# Patient Record
Sex: MALE | Race: OTHER | HISPANIC OR LATINO | Employment: FULL TIME | URBAN - METROPOLITAN AREA
[De-identification: names, ages, dates, MRNs, and addresses within clinical notes are randomized per-mention and may not be internally consistent; named-entity substitution may affect disease eponyms.]

---

## 2019-10-09 ENCOUNTER — HOSPITAL ENCOUNTER (EMERGENCY)
Facility: HOSPITAL | Age: 24
Discharge: HOME | End: 2019-10-09
Attending: EMERGENCY MEDICINE

## 2019-10-09 VITALS
RESPIRATION RATE: 16 BRPM | DIASTOLIC BLOOD PRESSURE: 98 MMHG | OXYGEN SATURATION: 100 % | WEIGHT: 210 LBS | HEIGHT: 66 IN | SYSTOLIC BLOOD PRESSURE: 159 MMHG | TEMPERATURE: 99 F | HEART RATE: 65 BPM | BODY MASS INDEX: 33.75 KG/M2

## 2019-10-09 DIAGNOSIS — L72.0 EPIDERMAL CYST OF EAR: Primary | ICD-10-CM

## 2019-10-09 DIAGNOSIS — L29.3 ITCHING OF PENIS: ICD-10-CM

## 2019-10-09 PROCEDURE — 99283 EMERGENCY DEPT VISIT LOW MDM: CPT | Mod: 25

## 2019-10-09 PROCEDURE — 10060 I&D ABSCESS SIMPLE/SINGLE: CPT | Mod: RT

## 2019-10-09 RX ORDER — NYSTATIN 100000 U/G
CREAM TOPICAL 2 TIMES DAILY
Qty: 15 G | Refills: 0 | Status: SHIPPED | OUTPATIENT
Start: 2019-10-09 | End: 2019-10-16

## 2019-10-09 RX ORDER — MUPIROCIN 20 MG/G
OINTMENT TOPICAL 3 TIMES DAILY
Qty: 30 G | Refills: 0 | Status: SHIPPED | OUTPATIENT
Start: 2019-10-09 | End: 2023-10-22

## 2019-10-09 RX ORDER — SULFAMETHOXAZOLE AND TRIMETHOPRIM 800; 160 MG/1; MG/1
1 TABLET ORAL 2 TIMES DAILY
Qty: 20 TABLET | Refills: 0 | Status: SHIPPED | OUTPATIENT
Start: 2019-10-09 | End: 2019-10-19

## 2019-10-10 NOTE — ED PROVIDER NOTES
Encounter Date: 10/9/2019       History     Chief Complaint   Patient presents with    Rash     RASH ON PENIS, BOIL BEHIND RIGHT EAR     24-year-old male, presents to the emergency department for evaluation of complaints of tenderness and swelling in the postauricular area of the right ear.    Patient also complains of itching to his penis.        Review of patient's allergies indicates:  No Known Allergies  No past medical history on file.  No past surgical history on file.  No family history on file.  Social History     Tobacco Use    Smoking status: Not on file   Substance Use Topics    Alcohol use: Not on file    Drug use: Not on file     Review of Systems   Constitutional: Negative for fever.   HENT: Negative for sore throat.    Eyes: Negative for redness.   Respiratory: Negative for shortness of breath.    Cardiovascular: Negative for chest pain.   Gastrointestinal: Negative for nausea.   Genitourinary: Negative for dysuria.        Penile pruritus   Musculoskeletal: Negative for back pain.   Skin: Positive for wound. Negative for rash.   Neurological: Negative for weakness.   Hematological: Does not bruise/bleed easily.   Psychiatric/Behavioral: Negative for behavioral problems. The patient is not nervous/anxious.    All other systems reviewed and are negative.      Physical Exam     Initial Vitals [10/09/19 2054]   BP Pulse Resp Temp SpO2   (!) 169/108 65 16 98.5 °F (36.9 °C) 100 %      MAP       --         Physical Exam    Nursing note and vitals reviewed.  Constitutional: He appears well-developed and well-nourished.   HENT:   Head: Normocephalic and atraumatic.       Right Ear: External ear normal.   A small 0.5 cm by 0.5 cm a palpable lesion, fluctuant consistent with a small cyst.  No obvious cellulitis.   Eyes: Conjunctivae, EOM and lids are normal.   Neck: Normal range of motion and full passive range of motion without pain.   Genitourinary: Testes normal. Uncircumcised. No phimosis, paraphimosis,  hypospadias, penile erythema or penile tenderness. No discharge found.   Genitourinary Comments: Patient with very mild erythema noted at the meatus.  No glaring balanitis.   Musculoskeletal: Normal range of motion.   Neurological: He is alert.   Skin: Skin is warm, dry and intact.   Psychiatric: He has a normal mood and affect. His speech is normal and behavior is normal.         ED Course   I & D - Incision and Drainage  Date/Time: 10/9/2019 9:10 PM  Performed by: CHUCK Martinez  Authorized by: Nir Stephens MD   Type: cyst  Body area: head/neck  Location details: scalp  Patient sedated: no  Incision type: single straight  Complexity: simple  Drainage: pus  Drainage amount: scant  Wound treatment: incision,  wound left open and  drainage  Technical procedures used: Bevel of an 18 gauge needle was used to rupture of the cyst and make a small 3 mm incision for drainage.  Complications: No  Specimens: No  Implants: No  Patient tolerance: Patient tolerated the procedure well with no immediate complications  Comments: Band-Aid placed        Labs Reviewed - No data to display       Imaging Results    None          Medical Decision Making:   Initial Assessment:   NAD  Differential Diagnosis:   The patient's differential diagnoses includes but is not limited to cyst, balanitis  ED Management:  24-year-old male, presents to the emergency department for evaluation of a lesion to the right ear, a small cyst which became secondarily infected.  Incised and drained.  Will place on a short course of antibiotics, local wound care instructions discussed.  Patient also states he is having some itching to his penis.  Patient is uncircumcised male, there are no ulcerative lesions, there is no penile drainage, patient does not have dysuria or other symptoms suggestive of STI.  Retraction of the foreskin and reduction of the foreskin is easily accomplished.  The meatus itself may have some very mild erythema, however there  is no evidence of a glaring balanitis.  Foreskin hygiene discussed.  Other:   I have discussed this case with another health care provider.       <> Summary of the Discussion: The patient's emergency department presentation, clinical course, pertinent findings of the physical exam as well as workup were discussed with the attending physician.  Plan of care was reviewed.                      Clinical Impression:       ICD-10-CM ICD-9-CM   1. Epidermal cyst of ear L72.0 706.2   2. Itching of penis L29.3 698.1                                CHUCK Martinez  10/09/19 2118

## 2021-01-26 ENCOUNTER — HOSPITAL ENCOUNTER (EMERGENCY)
Facility: HOSPITAL | Age: 26
Discharge: HOME | End: 2021-01-26
Attending: EMERGENCY MEDICINE

## 2021-01-26 VITALS
SYSTOLIC BLOOD PRESSURE: 126 MMHG | TEMPERATURE: 98 F | BODY MASS INDEX: 31.47 KG/M2 | OXYGEN SATURATION: 100 % | DIASTOLIC BLOOD PRESSURE: 88 MMHG | HEART RATE: 62 BPM | RESPIRATION RATE: 18 BRPM | WEIGHT: 195 LBS

## 2021-01-26 DIAGNOSIS — K02.9 DENTAL CARIES: ICD-10-CM

## 2021-01-26 DIAGNOSIS — R51.9 FACIAL PAIN: ICD-10-CM

## 2021-01-26 DIAGNOSIS — K08.89 PAIN, DENTAL: Primary | ICD-10-CM

## 2021-01-26 PROCEDURE — 99284 EMERGENCY DEPT VISIT MOD MDM: CPT

## 2021-01-26 RX ORDER — CARBAMAZEPINE 100 MG/1
100 TABLET, EXTENDED RELEASE ORAL 2 TIMES DAILY
Qty: 20 TABLET | Refills: 0 | Status: SHIPPED | OUTPATIENT
Start: 2021-01-26 | End: 2021-02-05

## 2021-01-26 RX ORDER — NAPROXEN 500 MG/1
500 TABLET ORAL 2 TIMES DAILY WITH MEALS
Qty: 14 TABLET | Refills: 0 | Status: SHIPPED | OUTPATIENT
Start: 2021-01-26 | End: 2021-02-02

## 2021-09-10 ENCOUNTER — HOSPITAL ENCOUNTER (EMERGENCY)
Facility: HOSPITAL | Age: 26
Discharge: HOME | End: 2021-09-10
Attending: EMERGENCY MEDICINE
Payer: COMMERCIAL

## 2021-09-10 VITALS
OXYGEN SATURATION: 95 % | BODY MASS INDEX: 31.83 KG/M2 | DIASTOLIC BLOOD PRESSURE: 100 MMHG | SYSTOLIC BLOOD PRESSURE: 158 MMHG | HEART RATE: 82 BPM | RESPIRATION RATE: 16 BRPM | TEMPERATURE: 99 F | HEIGHT: 68 IN | WEIGHT: 210 LBS

## 2021-09-10 DIAGNOSIS — S39.012A STRAIN OF LUMBAR REGION, INITIAL ENCOUNTER: ICD-10-CM
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
## 2021-09-10 PROCEDURE — 99283 EMERGENCY DEPT VISIT LOW MDM: CPT

## 2023-10-22 ENCOUNTER — HOSPITAL ENCOUNTER (EMERGENCY)
Facility: HOSPITAL | Age: 28
Discharge: HOME | End: 2023-10-22
Attending: STUDENT IN AN ORGANIZED HEALTH CARE EDUCATION/TRAINING PROGRAM

## 2023-10-22 VITALS
DIASTOLIC BLOOD PRESSURE: 82 MMHG | RESPIRATION RATE: 20 BRPM | SYSTOLIC BLOOD PRESSURE: 150 MMHG | WEIGHT: 210 LBS | OXYGEN SATURATION: 99 % | HEART RATE: 98 BPM | BODY MASS INDEX: 33.75 KG/M2 | HEIGHT: 66 IN | TEMPERATURE: 99 F

## 2023-10-22 DIAGNOSIS — R07.9 CHEST PAIN: ICD-10-CM

## 2023-10-22 DIAGNOSIS — R07.9 CHEST PAIN, UNSPECIFIED TYPE: ICD-10-CM

## 2023-10-22 DIAGNOSIS — F41.0 PANIC ATTACK: ICD-10-CM

## 2023-10-22 DIAGNOSIS — R06.02 SOB (SHORTNESS OF BREATH): Primary | ICD-10-CM

## 2023-10-22 DIAGNOSIS — I10 HYPERTENSION, UNSPECIFIED TYPE: ICD-10-CM

## 2023-10-22 LAB
ALBUMIN SERPL BCP-MCNC: 4.9 G/DL (ref 3.5–5.2)
ALP SERPL-CCNC: 81 U/L (ref 55–135)
ALT SERPL W/O P-5'-P-CCNC: 90 U/L (ref 10–44)
ANION GAP SERPL CALC-SCNC: 12 MMOL/L (ref 8–16)
AST SERPL-CCNC: 29 U/L (ref 10–40)
BASOPHILS # BLD AUTO: 0.07 K/UL (ref 0–0.2)
BASOPHILS NFR BLD: 0.5 % (ref 0–1.9)
BILIRUB SERPL-MCNC: 0.5 MG/DL (ref 0.1–1)
BNP SERPL-MCNC: 13 PG/ML (ref 0–99)
BUN SERPL-MCNC: 8 MG/DL (ref 6–20)
CALCIUM SERPL-MCNC: 9.7 MG/DL (ref 8.7–10.5)
CHLORIDE SERPL-SCNC: 103 MMOL/L (ref 95–110)
CO2 SERPL-SCNC: 20 MMOL/L (ref 23–29)
CREAT SERPL-MCNC: 1.1 MG/DL (ref 0.5–1.4)
D DIMER PPP IA.FEU-MCNC: 0.22 MG/L FEU
DIFFERENTIAL METHOD: ABNORMAL
EOSINOPHIL # BLD AUTO: 0.1 K/UL (ref 0–0.5)
EOSINOPHIL NFR BLD: 0.5 % (ref 0–8)
ERYTHROCYTE [DISTWIDTH] IN BLOOD BY AUTOMATED COUNT: 12.6 % (ref 11.5–14.5)
EST. GFR  (NO RACE VARIABLE): >60 ML/MIN/1.73 M^2
GLUCOSE SERPL-MCNC: 123 MG/DL (ref 70–110)
HCT VFR BLD AUTO: 46.4 % (ref 40–54)
HGB BLD-MCNC: 16.8 G/DL (ref 14–18)
IMM GRANULOCYTES # BLD AUTO: 0.05 K/UL (ref 0–0.04)
IMM GRANULOCYTES NFR BLD AUTO: 0.3 % (ref 0–0.5)
INR PPP: 1 (ref 0.8–1.2)
LYMPHOCYTES # BLD AUTO: 1.9 K/UL (ref 1–4.8)
LYMPHOCYTES NFR BLD: 12.8 % (ref 18–48)
MCH RBC QN AUTO: 32.2 PG (ref 27–31)
MCHC RBC AUTO-ENTMCNC: 36.2 G/DL (ref 32–36)
MCV RBC AUTO: 89 FL (ref 82–98)
MONOCYTES # BLD AUTO: 0.9 K/UL (ref 0.3–1)
MONOCYTES NFR BLD: 5.9 % (ref 4–15)
NEUTROPHILS # BLD AUTO: 11.7 K/UL (ref 1.8–7.7)
NEUTROPHILS NFR BLD: 80 % (ref 38–73)
NRBC BLD-RTO: 0 /100 WBC
PLATELET # BLD AUTO: 278 K/UL (ref 150–450)
PMV BLD AUTO: 11.9 FL (ref 9.2–12.9)
POTASSIUM SERPL-SCNC: 3.6 MMOL/L (ref 3.5–5.1)
PROT SERPL-MCNC: 7.9 G/DL (ref 6–8.4)
PROTHROMBIN TIME: 10.9 SEC (ref 9–12.5)
RBC # BLD AUTO: 5.21 M/UL (ref 4.6–6.2)
SODIUM SERPL-SCNC: 135 MMOL/L (ref 136–145)
TROPONIN I SERPL HS-MCNC: 13.6 PG/ML (ref 0–14.9)
TROPONIN I SERPL HS-MCNC: 14.2 PG/ML (ref 0–14.9)
TROPONIN I SERPL HS-MCNC: 15.4 PG/ML (ref 0–14.9)
WBC # BLD AUTO: 14.65 K/UL (ref 3.9–12.7)

## 2023-10-22 PROCEDURE — 63600175 PHARM REV CODE 636 W HCPCS: Performed by: STUDENT IN AN ORGANIZED HEALTH CARE EDUCATION/TRAINING PROGRAM

## 2023-10-22 PROCEDURE — 99285 EMERGENCY DEPT VISIT HI MDM: CPT | Mod: 25

## 2023-10-22 PROCEDURE — 85379 FIBRIN DEGRADATION QUANT: CPT | Performed by: STUDENT IN AN ORGANIZED HEALTH CARE EDUCATION/TRAINING PROGRAM

## 2023-10-22 PROCEDURE — 80307 DRUG TEST PRSMV CHEM ANLYZR: CPT | Performed by: STUDENT IN AN ORGANIZED HEALTH CARE EDUCATION/TRAINING PROGRAM

## 2023-10-22 PROCEDURE — 85610 PROTHROMBIN TIME: CPT | Performed by: STUDENT IN AN ORGANIZED HEALTH CARE EDUCATION/TRAINING PROGRAM

## 2023-10-22 PROCEDURE — 96361 HYDRATE IV INFUSION ADD-ON: CPT

## 2023-10-22 PROCEDURE — 96374 THER/PROPH/DIAG INJ IV PUSH: CPT

## 2023-10-22 PROCEDURE — 80324 DRUG SCREEN AMPHETAMINES 1/2: CPT | Performed by: STUDENT IN AN ORGANIZED HEALTH CARE EDUCATION/TRAINING PROGRAM

## 2023-10-22 PROCEDURE — 83880 ASSAY OF NATRIURETIC PEPTIDE: CPT | Performed by: STUDENT IN AN ORGANIZED HEALTH CARE EDUCATION/TRAINING PROGRAM

## 2023-10-22 PROCEDURE — 80359 METHYLENEDIOXYAMPHETAMINES: CPT | Performed by: STUDENT IN AN ORGANIZED HEALTH CARE EDUCATION/TRAINING PROGRAM

## 2023-10-22 PROCEDURE — 93005 ELECTROCARDIOGRAM TRACING: CPT | Performed by: INTERNAL MEDICINE

## 2023-10-22 PROCEDURE — 84484 ASSAY OF TROPONIN QUANT: CPT | Performed by: STUDENT IN AN ORGANIZED HEALTH CARE EDUCATION/TRAINING PROGRAM

## 2023-10-22 PROCEDURE — 85025 COMPLETE CBC W/AUTO DIFF WBC: CPT | Performed by: STUDENT IN AN ORGANIZED HEALTH CARE EDUCATION/TRAINING PROGRAM

## 2023-10-22 PROCEDURE — 80053 COMPREHEN METABOLIC PANEL: CPT | Performed by: STUDENT IN AN ORGANIZED HEALTH CARE EDUCATION/TRAINING PROGRAM

## 2023-10-22 PROCEDURE — 93010 ELECTROCARDIOGRAM REPORT: CPT | Mod: ,,, | Performed by: INTERNAL MEDICINE

## 2023-10-22 PROCEDURE — 93010 EKG 12-LEAD: ICD-10-PCS | Mod: ,,, | Performed by: INTERNAL MEDICINE

## 2023-10-22 PROCEDURE — 25000003 PHARM REV CODE 250: Performed by: STUDENT IN AN ORGANIZED HEALTH CARE EDUCATION/TRAINING PROGRAM

## 2023-10-22 RX ORDER — LORAZEPAM 2 MG/ML
1 INJECTION INTRAMUSCULAR
Status: COMPLETED | OUTPATIENT
Start: 2023-10-22 | End: 2023-10-22

## 2023-10-22 RX ADMIN — SODIUM CHLORIDE 1000 ML: 0.9 INJECTION, SOLUTION INTRAVENOUS at 12:10

## 2023-10-22 RX ADMIN — LORAZEPAM 1 MG: 2 INJECTION INTRAMUSCULAR; INTRAVENOUS at 01:10

## 2023-10-22 NOTE — ED PROVIDER NOTES
Encounter Date: 10/22/2023       History     Chief Complaint   Patient presents with    Shortness of Breath     Patient brought by EMS initial call was for short of breath    Chest Pain     Left side chest pain     HPI    28-year-old male with no significant past medical history presenting with 1 hour of chest pain, shortness of breath, anxiety.  Symptoms started suddenly while he was driving.  EMS found him with a blood pressure 220/100, slightly diaphoretic and anxious appearing.  Patient denies any previous symptoms like this before.  No new medications.  No rash.    Review of patient's allergies indicates:  No Known Allergies  No past medical history on file.  No past surgical history on file.  No family history on file.  Social History     Tobacco Use    Smoking status: Every Day     Current packs/day: 0.50     Types: Cigarettes    Smokeless tobacco: Never   Substance Use Topics    Alcohol use: Yes     Comment: soc     Review of Systems   Constitutional:  Negative for activity change, appetite change, chills and fever.   HENT:  Negative for congestion and rhinorrhea.    Respiratory:  Positive for chest tightness and shortness of breath.    Cardiovascular:  Positive for chest pain.   Gastrointestinal:  Negative for abdominal pain, nausea and vomiting.   Skin:  Negative for rash and wound.   Neurological:  Negative for light-headedness and headaches.   Psychiatric/Behavioral:  The patient is nervous/anxious.        Physical Exam     Initial Vitals [10/22/23 1229]   BP Pulse Resp Temp SpO2   137/89 (!) 138 18 99.8 °F (37.7 °C) 99 %      MAP       --         Physical Exam    Constitutional: He appears well-developed. He appears distressed.   HENT:   Head: Normocephalic and atraumatic.   Eyes: Conjunctivae are normal.   Neck:   Normal range of motion.  Cardiovascular:      Exam reveals no gallop and no friction rub.       No murmur heard.  Tachycardia with rate of 140, regular appearing   Pulmonary/Chest: Breath  sounds normal. No respiratory distress. He has no wheezes. He has no rhonchi. He has no rales.   Abdominal: Abdomen is soft. He exhibits no distension. There is no abdominal tenderness.   Musculoskeletal:         General: No edema. Normal range of motion.      Cervical back: Normal range of motion.     Neurological: He is alert and oriented to person, place, and time.   Skin: Skin is warm. Capillary refill takes less than 2 seconds.         ED Course   Procedures  Labs Reviewed   CBC W/ AUTO DIFFERENTIAL - Abnormal; Notable for the following components:       Result Value    WBC 14.65 (*)     MCH 32.2 (*)     MCHC 36.2 (*)     Gran # (ANC) 11.7 (*)     Immature Grans (Abs) 0.05 (*)     Gran % 80.0 (*)     Lymph % 12.8 (*)     All other components within normal limits   COMPREHENSIVE METABOLIC PANEL - Abnormal; Notable for the following components:    Sodium 135 (*)     CO2 20 (*)     Glucose 123 (*)     ALT 90 (*)     All other components within normal limits   TROPONIN I HIGH SENSITIVITY - Abnormal; Notable for the following components:    Troponin I High Sensitivity 15.4 (*)     All other components within normal limits   B-TYPE NATRIURETIC PEPTIDE   TROPONIN I HIGH SENSITIVITY   TROPONIN I HIGH SENSITIVITY   PROTIME-INR   D DIMER, QUANTITATIVE   DRUGS OF ABUSE SCREEN, BLOOD        ECG Results              Repeat EKG 12-lead (In process)  Result time 10/22/23 14:10:24      In process by Interface, Lab In [facility] (10/22/23 14:10:24)                   Narrative:    Test Reason : R07.9,    Vent. Rate : 106 BPM     Atrial Rate : 106 BPM     P-R Int : 136 ms          QRS Dur : 092 ms      QT Int : 328 ms       P-R-T Axes : 050 022 033 degrees     QTc Int : 435 ms    Sinus tachycardia  Otherwise normal ECG  When compared with ECG of 22-OCT-2023 12:34,  No significant change was found    Referred By: [email or web address]   SELF           Confirmed By:                                      EKG 12-lead (In process)  Result time  10/22/23 13:03:42      In process by Interface, Lab In [facility] (10/22/23 13:03:42)                   Narrative:    Test Reason : R07.9,    Vent. Rate : 136 BPM     Atrial Rate : 136 BPM     P-R Int : 130 ms          QRS Dur : 086 ms      QT Int : 300 ms       P-R-T Axes : 040 032 027 degrees     QTc Int : 451 ms    Sinus tachycardia  Junctional ST depression, probably normal  Borderline Abnormal ECG  No previous ECGs available    Referred By:             Confirmed By:                                   Imaging Results              X-Ray Chest AP Portable (Final result)  Result time 10/22/23 12:58:26      Final result by Cheryl Lawson MD (10/22/23 12:58:26)                   Narrative:    XR CHEST 1 VIEW    CLINICAL HISTORY:  28 years Male chest pain    COMPARISON: None    FINDINGS: Cardiomediastinal silhouette is within normal limits. Lungs are normally expanded with no airspace consolidation. No pleural effusion or pneumothorax. No acute osseous abnormality.    IMPRESSION: No acute pulmonary process.    Electronically signed by:  Cheryl Lawson MD  10/22/2023 12:58 PM CDT Workstation: [id number]                                     Medications   sodium chloride 0.9% bolus 1,000 mL 1,000 mL (0 mLs Intravenous Stopped 10/22/23 1356)   LORazepam injection 1 mg (1 mg Intravenous Given 10/22/23 1308)     Medical Decision Making  28-year-old male presenting with acute onset shortness of breath, left-sided chest pain, and anxiety.  No previous symptoms similar.  Initial blood pressure was 200/100, heart rate 140, oxygen saturation 100% with an respiratory rate of 20.  Lungs were clear.  No murmur.  Bedside ultrasound without any pneumothorax or pericardial effusion.  EKG initially sinus tach with a rate around 140 with suspected rate dependent ST depression without any ST elevation.  Repeat troponin is no Troponin initially 14 with repeat a 15.  D-dimer negative.  Chest x-ray without any pneumothorax and no airspace  disease.  Patient received 1 mg of Ativan with resolution of symptoms.  He is currently feeling at baseline without shortness of breath.  Heart rate is in the 90s and blood pressure has improved to 130/77.  Low suspicion for ACS given age, lack of risk factors, and normal EKG.  No significant change on repeat troponin.  PE is unlikely given normal D-dimer.  We will discharge patient with [facility] referral and strict return precautions.    Candelario Crook MD  Emergency Medicine      Amount and/or Complexity of Data Reviewed  Labs: ordered.  Radiology: ordered.    Risk  Prescription drug management.                               Clinical Impression:   Final diagnoses:  [R07.9] Chest pain  [R07.9] Chest pain, unspecified type  [I10] Hypertension, unspecified type  [R06.02] SOB (shortness of breath) (Primary)  [F41.0] Panic attack        ED Disposition Condition    Discharge Stable          ED Prescriptions    None       Follow-up Information       Follow up With Specialties Details Why Contact Info Additional Information    [facility] - Emergency Dept Emergency Medicine  As needed, If symptoms worsen including recurrent chest pain, shortness of breath, anxiety, any other concerns [address]  [facility] [id number]  [phone] 1st floor    [facility]  Schedule an appointment as soon as possible for a visit   [address]  [facility] [address]  [phone]                Candelario Crook MD  10/22/23 4852

## 2023-10-23 ENCOUNTER — HOSPITAL ENCOUNTER (EMERGENCY)
Facility: HOSPITAL | Age: 28
Discharge: HOME | End: 2023-10-23
Attending: EMERGENCY MEDICINE

## 2023-10-23 VITALS
WEIGHT: 210 LBS | DIASTOLIC BLOOD PRESSURE: 97 MMHG | BODY MASS INDEX: 33.75 KG/M2 | HEART RATE: 68 BPM | OXYGEN SATURATION: 97 % | SYSTOLIC BLOOD PRESSURE: 154 MMHG | TEMPERATURE: 98 F | RESPIRATION RATE: 24 BRPM | HEIGHT: 66 IN

## 2023-10-23 DIAGNOSIS — R07.9 CHEST PAIN: ICD-10-CM

## 2023-10-23 DIAGNOSIS — R03.0 BLOOD PRESSURE ELEVATED WITHOUT HISTORY OF HTN: Primary | ICD-10-CM

## 2023-10-23 DIAGNOSIS — R06.02 SOB (SHORTNESS OF BREATH): ICD-10-CM

## 2023-10-23 LAB
ALBUMIN SERPL BCP-MCNC: 4.5 G/DL (ref 3.5–5.2)
ALP SERPL-CCNC: 89 U/L (ref 55–135)
ALT SERPL W/O P-5'-P-CCNC: 100 U/L (ref 10–44)
ANION GAP SERPL CALC-SCNC: 12 MMOL/L (ref 8–16)
AST SERPL-CCNC: 38 U/L (ref 10–40)
BASOPHILS # BLD AUTO: 0.05 K/UL (ref 0–0.2)
BASOPHILS NFR BLD: 0.7 % (ref 0–1.9)
BILIRUB SERPL-MCNC: 0.9 MG/DL (ref 0.1–1)
BUN SERPL-MCNC: 10 MG/DL (ref 6–20)
CALCIUM SERPL-MCNC: 9.7 MG/DL (ref 8.7–10.5)
CHLORIDE SERPL-SCNC: 108 MMOL/L (ref 95–110)
CO2 SERPL-SCNC: 19 MMOL/L (ref 23–29)
CREAT SERPL-MCNC: 0.9 MG/DL (ref 0.5–1.4)
DIFFERENTIAL METHOD: ABNORMAL
EOSINOPHIL # BLD AUTO: 0.1 K/UL (ref 0–0.5)
EOSINOPHIL NFR BLD: 0.8 % (ref 0–8)
ERYTHROCYTE [DISTWIDTH] IN BLOOD BY AUTOMATED COUNT: 12.4 % (ref 11.5–14.5)
EST. GFR  (NO RACE VARIABLE): >60 ML/MIN/1.73 M^2
GLUCOSE SERPL-MCNC: 104 MG/DL (ref 70–110)
HCT VFR BLD AUTO: 49.5 % (ref 40–54)
HGB BLD-MCNC: 17.6 G/DL (ref 14–18)
IMM GRANULOCYTES # BLD AUTO: 0.03 K/UL (ref 0–0.04)
IMM GRANULOCYTES NFR BLD AUTO: 0.4 % (ref 0–0.5)
LYMPHOCYTES # BLD AUTO: 1.7 K/UL (ref 1–4.8)
LYMPHOCYTES NFR BLD: 22.6 % (ref 18–48)
MCH RBC QN AUTO: 32.1 PG (ref 27–31)
MCHC RBC AUTO-ENTMCNC: 35.6 G/DL (ref 32–36)
MCV RBC AUTO: 90 FL (ref 82–98)
MONOCYTES # BLD AUTO: 0.5 K/UL (ref 0.3–1)
MONOCYTES NFR BLD: 6.9 % (ref 4–15)
NEUTROPHILS # BLD AUTO: 5.1 K/UL (ref 1.8–7.7)
NEUTROPHILS NFR BLD: 68.6 % (ref 38–73)
NRBC BLD-RTO: 0 /100 WBC
PLATELET # BLD AUTO: 264 K/UL (ref 150–450)
PMV BLD AUTO: 12 FL (ref 9.2–12.9)
POTASSIUM SERPL-SCNC: 3.8 MMOL/L (ref 3.5–5.1)
PROT SERPL-MCNC: 7.9 G/DL (ref 6–8.4)
RBC # BLD AUTO: 5.48 M/UL (ref 4.6–6.2)
SODIUM SERPL-SCNC: 139 MMOL/L (ref 136–145)
TROPONIN I SERPL DL<=0.01 NG/ML-MCNC: 0.02 NG/ML (ref 0–0.03)
WBC # BLD AUTO: 7.44 K/UL (ref 3.9–12.7)

## 2023-10-23 PROCEDURE — 93010 EKG 12-LEAD: ICD-10-PCS | Mod: ,,, | Performed by: GENERAL PRACTICE

## 2023-10-23 PROCEDURE — 80053 COMPREHEN METABOLIC PANEL: CPT | Performed by: PHYSICIAN ASSISTANT

## 2023-10-23 PROCEDURE — 85025 COMPLETE CBC W/AUTO DIFF WBC: CPT | Performed by: PHYSICIAN ASSISTANT

## 2023-10-23 PROCEDURE — 99285 EMERGENCY DEPT VISIT HI MDM: CPT | Mod: 25

## 2023-10-23 PROCEDURE — 36415 COLL VENOUS BLD VENIPUNCTURE: CPT | Performed by: PHYSICIAN ASSISTANT

## 2023-10-23 PROCEDURE — 93010 ELECTROCARDIOGRAM REPORT: CPT | Mod: ,,, | Performed by: GENERAL PRACTICE

## 2023-10-23 PROCEDURE — 93005 ELECTROCARDIOGRAM TRACING: CPT

## 2023-10-23 PROCEDURE — 84484 ASSAY OF TROPONIN QUANT: CPT | Performed by: PHYSICIAN ASSISTANT

## 2023-10-24 NOTE — ED PROVIDER NOTES
Encounter Date: 10/23/2023       History     Chief Complaint   Patient presents with    Shortness of Breath     Started yesterday     Hypertension    Chest Pain     Armando Lowe is a 28 y.o. male presenting for evaluation of recurrent chest pain and shortness of breath despite being evaluated in the ED at [facility] yesterday. No fever, no chills.  He is worried about his blood pressure being elevated.  No nausea, vomiting, diarrhea.  No cough.   He has no past medical history on file.      The history is provided by the patient.     Review of patient's allergies indicates:  No Known Allergies  No past medical history on file.  No past surgical history on file.  No family history on file.  Social History     Tobacco Use    Smoking status: Every Day     Current packs/day: 0.50     Types: Cigarettes    Smokeless tobacco: Never   Substance Use Topics    Alcohol use: Yes     Comment: soc     Review of Systems   Constitutional:  Negative for chills and fever.        Elevated blood pressure.    Respiratory:  Positive for shortness of breath. Negative for cough, chest tightness and wheezing.    Cardiovascular:  Positive for chest pain. Negative for palpitations.   Gastrointestinal:  Negative for abdominal pain, diarrhea, nausea and vomiting.   Genitourinary:  Negative for difficulty urinating.   Musculoskeletal:  Negative for arthralgias, back pain, joint swelling, myalgias, neck pain and neck stiffness.   Skin:  Negative for color change, pallor, rash and wound.   Neurological:  Negative for weakness and numbness.   Hematological:  Does not bruise/bleed easily.       Physical Exam     Initial Vitals [10/23/23 0917]   BP Pulse Resp Temp SpO2   (!) 168/107 76 (!) 24 98.1 °F (36.7 °C) 100 %      MAP       --         Physical Exam    Nursing note and vitals reviewed.  Constitutional: He appears well-developed and well-nourished. He is not diaphoretic. No distress.   HENT:   Head: Normocephalic and atraumatic.   Right  Ear: External ear normal.   Left Ear: External ear normal.   Nose: Nose normal.   Mouth/Throat: Oropharynx is clear and moist.   Eyes: Conjunctivae and EOM are normal. Pupils are equal, round, and reactive to light.   Neck: Neck supple.   Normal range of motion.  Cardiovascular:  Normal rate, regular rhythm, normal heart sounds and intact distal pulses.     Exam reveals no gallop and no friction rub.       No murmur heard.  Pulmonary/Chest: Breath sounds normal. No respiratory distress. He has no wheezes. He has no rhonchi. He has no rales.   Equal, bilateral breath sounds noted without wheezing.     Abdominal: Abdomen is soft. He exhibits no distension and no mass. There is no abdominal tenderness.   No palpable abdominal tenderness noted.      Musculoskeletal:         General: No tenderness or edema. Normal range of motion.      Cervical back: Normal range of motion and neck supple.     Neurological: He is alert and oriented to person, place, and time. He has normal strength. No cranial nerve deficit or sensory deficit.   Skin: Skin is warm and dry. No rash and no abscess noted. No erythema.   Psychiatric:   Pt is anxious on exam.          ED Course   Procedures  Labs Reviewed   CBC W/ AUTO DIFFERENTIAL - Abnormal; Notable for the following components:       Result Value    MCH 32.1 (*)     All other components within normal limits   COMPREHENSIVE METABOLIC PANEL - Abnormal; Notable for the following components:    CO2 19 (*)      (*)     All other components within normal limits   TROPONIN I          Imaging Results              X-Ray Chest PA And Lateral (Final result)  Result time 10/23/23 10:57:16      Final result by Gorge Lucas MD (10/23/23 10:57:16)                   Impression:      No acute process.  No significant change.      Electronically signed by: Gorge Lucas MD  Date:    10/23/2023  Time:    10:57               Narrative:    EXAMINATION:  XR CHEST PA AND LATERAL    CLINICAL  HISTORY:  Shortness of breath    TECHNIQUE:  PA and lateral views of the chest were performed.    COMPARISON:  10/22/2023    FINDINGS:  The cardiomediastinal silhouette is within normal limits.  Lung volumes are moderately low.  There is no consolidation or pleural effusion.                                       Medications - No data to display  Medical Decision Making  Differential Diagnosis:   ACS   Pneumonia   Pneumothorax  CHF   HTN   Anxiety     Pt emergently evaluated here in the ED.    Pt is well appearing on exam, but does seem to have some underlying anxiety.  EKG shows no evidence for acute ischemia or arrhythmia.  Labs stable here without leukocytosis.  Normal troponin.  Normal electrolytes.  On re-evaluation, he states he is feeling better and is ready for discharge home.  He will be referred to PCP for re-evaluation and further management.  CXR shows no evidence for acute intrapulmonary process.  He had a negative cardiac work[facility] yesterday as well.  Patient voices understanding and is agreeable to the plan.  Specific return precautions are given.        Amount and/or Complexity of Data Reviewed  Labs: ordered.  Radiology: ordered.               ED Course as of 10/23/23 2123   Mon Oct 23, 2023   0932 Sinus rhythm 77 beats per minute normal axis no ST elevation or depression or T-wave inversion independently interpreted [EF]   1008 BP(!): 168/107 [EF]   1008 Temp: 98.1 °F (36.7 °C) [EF]   1008 Temp Source: Oral [EF]   1008 Pulse: 76 [EF]   1008 Resp(!): 24 [EF]   1008 SpO2: 100 % [EF]      ED Course User Index  [EF] Matt Araujo MD                    Clinical Impression:   Final diagnoses:  [R07.9] Chest pain  [R06.02] SOB (shortness of breath)  [R03.0] Blood pressure elevated without history of HTN (Primary)        ED Disposition Condition    Discharge Stable          ED Prescriptions    None       Follow-up Information       Follow up With Specialties Details Why Contact Info Additional  Information    [place] [facility] - ED Emergency Medicine  As needed, If symptoms worsen [address] [place] [address] [address] [place] [id number] [address] [place] [address]   for primary care evaluation--make appointment to establish care [address] [place] [address] [place]   [place] [address]  [phone]                Swetha Dash PA-C  10/23/23 2121

## 2023-11-03 LAB
AMPHET SERPLBLD CFM-MCNC: 22 NG/ML
AMPHETAMINES SERPL QL SCN: ABNORMAL NG/ML
AMPHETAMINES SPEC QL: POSITIVE
BARBITURATES SERPL QL SCN: NEGATIVE UG/ML
BENZODIAZ SERPL QL SCN: NEGATIVE NG/ML
BENZODIAZ SERPL QL SCN: NEGATIVE NG/ML
CANNABINOIDS SERPL QL SCN: NEGATIVE NG/ML
MDA SERPLBLD-MCNC: NEGATIVE NG/ML
MDEA SERPLBLD CFM-MCNC: NEGATIVE NG/ML
MDMA SERPLBLD-MCNC: NEGATIVE NG/ML
METHADONE SERPL QL SCN: NEGATIVE NG/ML
METHAMPHET SERPLBLD CFM-MCNC: 137 NG/ML
OPIATES SERPL QL SCN: NEGATIVE NG/ML
OXYCODONE+OXYMORPHONE SERPLBLD QL SCN: NEGATIVE NG/ML
PCP SERPL QL SCN: NEGATIVE NG/ML
PROPOXYPH SERPL QL SCN: NEGATIVE NG/ML

## 2024-05-01 ENCOUNTER — HOSPITAL ENCOUNTER (OUTPATIENT)
Facility: HOSPITAL | Age: 29
Discharge: HOME | End: 2024-05-02
Attending: EMERGENCY MEDICINE | Admitting: HOSPITALIST

## 2024-05-01 DIAGNOSIS — F17.210 TOBACCO DEPENDENCE DUE TO CIGARETTES: ICD-10-CM

## 2024-05-01 DIAGNOSIS — R07.9 CHEST PAIN: Primary | ICD-10-CM

## 2024-05-01 DIAGNOSIS — R07.89 OTHER CHEST PAIN: ICD-10-CM

## 2024-05-01 DIAGNOSIS — F10.10 ALCOHOL ABUSE: ICD-10-CM

## 2024-05-01 LAB
ALBUMIN SERPL BCP-MCNC: 4.3 G/DL (ref 3.5–5.2)
ALP SERPL-CCNC: 75 U/L (ref 55–135)
ALT SERPL W/O P-5'-P-CCNC: 79 U/L (ref 10–44)
AMPHET+METHAMPHET UR QL: NEGATIVE
ANION GAP SERPL CALC-SCNC: 10 MMOL/L (ref 8–16)
AST SERPL-CCNC: 31 U/L (ref 10–40)
BARBITURATES UR QL SCN>200 NG/ML: NEGATIVE
BASOPHILS # BLD AUTO: 0.05 K/UL (ref 0–0.2)
BASOPHILS NFR BLD: 0.5 % (ref 0–1.9)
BENZODIAZ UR QL SCN>200 NG/ML: NEGATIVE
BILIRUB SERPL-MCNC: 0.8 MG/DL (ref 0.1–1)
BILIRUB UR QL STRIP: NEGATIVE
BUN SERPL-MCNC: 11 MG/DL (ref 6–20)
BZE UR QL SCN: NEGATIVE
CALCIUM SERPL-MCNC: 9.5 MG/DL (ref 8.7–10.5)
CANNABINOIDS UR QL SCN: NEGATIVE
CHLORIDE SERPL-SCNC: 107 MMOL/L (ref 95–110)
CLARITY UR: CLEAR
CO2 SERPL-SCNC: 21 MMOL/L (ref 23–29)
COLOR UR: YELLOW
CREAT SERPL-MCNC: 0.9 MG/DL (ref 0.5–1.4)
CREAT UR-MCNC: 363.3 MG/DL (ref 23–375)
D DIMER PPP IA.FEU-MCNC: <0.19 MG/L FEU
DIFFERENTIAL METHOD BLD: ABNORMAL
EOSINOPHIL # BLD AUTO: 0.2 K/UL (ref 0–0.5)
EOSINOPHIL NFR BLD: 2.1 % (ref 0–8)
ERYTHROCYTE [DISTWIDTH] IN BLOOD BY AUTOMATED COUNT: 12.2 % (ref 11.5–14.5)
EST. GFR  (NO RACE VARIABLE): >60 ML/MIN/1.73 M^2
ETHANOL SERPL-MCNC: <10 MG/DL
GLUCOSE SERPL-MCNC: 116 MG/DL (ref 70–110)
GLUCOSE UR QL STRIP: NEGATIVE
HCT VFR BLD AUTO: 45.3 % (ref 40–54)
HGB BLD-MCNC: 16.3 G/DL (ref 14–18)
HGB UR QL STRIP: NEGATIVE
IMM GRANULOCYTES # BLD AUTO: 0.03 K/UL (ref 0–0.04)
IMM GRANULOCYTES NFR BLD AUTO: 0.3 % (ref 0–0.5)
KETONES UR QL STRIP: NEGATIVE
LEUKOCYTE ESTERASE UR QL STRIP: NEGATIVE
LYMPHOCYTES # BLD AUTO: 2.1 K/UL (ref 1–4.8)
LYMPHOCYTES NFR BLD: 20.8 % (ref 18–48)
MAGNESIUM SERPL-MCNC: 2 MG/DL (ref 1.6–2.6)
MCH RBC QN AUTO: 31.2 PG (ref 27–31)
MCHC RBC AUTO-ENTMCNC: 36 G/DL (ref 32–36)
MCV RBC AUTO: 87 FL (ref 82–98)
METHADONE UR QL SCN>300 NG/ML: NEGATIVE
MONOCYTES # BLD AUTO: 0.6 K/UL (ref 0.3–1)
MONOCYTES NFR BLD: 5.7 % (ref 4–15)
NEUTROPHILS # BLD AUTO: 7.1 K/UL (ref 1.8–7.7)
NEUTROPHILS NFR BLD: 70.6 % (ref 38–73)
NITRITE UR QL STRIP: NEGATIVE
NRBC BLD-RTO: 0 /100 WBC
OPIATES UR QL SCN: NEGATIVE
PCP UR QL SCN>25 NG/ML: NEGATIVE
PH UR STRIP: 6 [PH] (ref 5–8)
PHOSPHATE SERPL-MCNC: 4 MG/DL (ref 2.7–4.5)
PLATELET # BLD AUTO: 266 K/UL (ref 150–450)
PMV BLD AUTO: 11.6 FL (ref 9.2–12.9)
POTASSIUM SERPL-SCNC: 3.8 MMOL/L (ref 3.5–5.1)
PROT SERPL-MCNC: 7.5 G/DL (ref 6–8.4)
PROT UR QL STRIP: ABNORMAL
RBC # BLD AUTO: 5.22 M/UL (ref 4.6–6.2)
SODIUM SERPL-SCNC: 138 MMOL/L (ref 136–145)
SP GR UR STRIP: >1.03 (ref 1–1.03)
TOXICOLOGY INFORMATION: NORMAL
TROPONIN I SERPL DL<=0.01 NG/ML-MCNC: 0.02 NG/ML (ref 0–0.03)
URN SPEC COLLECT METH UR: ABNORMAL
UROBILINOGEN UR STRIP-ACNC: ABNORMAL EU/DL
WBC # BLD AUTO: 10.08 K/UL (ref 3.9–12.7)

## 2024-05-01 PROCEDURE — 84484 ASSAY OF TROPONIN QUANT: CPT | Mod: 91

## 2024-05-01 PROCEDURE — 82077 ASSAY SPEC XCP UR&BREATH IA: CPT

## 2024-05-01 PROCEDURE — 84484 ASSAY OF TROPONIN QUANT: CPT | Performed by: NURSE PRACTITIONER

## 2024-05-01 PROCEDURE — G0378 HOSPITAL OBSERVATION PER HR: HCPCS

## 2024-05-01 PROCEDURE — 80307 DRUG TEST PRSMV CHEM ANLYZR: CPT

## 2024-05-01 PROCEDURE — 83735 ASSAY OF MAGNESIUM: CPT

## 2024-05-01 PROCEDURE — 25000003 PHARM REV CODE 250

## 2024-05-01 PROCEDURE — 99900031 HC PATIENT EDUCATION (STAT)

## 2024-05-01 PROCEDURE — 85379 FIBRIN DEGRADATION QUANT: CPT | Performed by: NURSE PRACTITIONER

## 2024-05-01 PROCEDURE — 84100 ASSAY OF PHOSPHORUS: CPT

## 2024-05-01 PROCEDURE — 84443 ASSAY THYROID STIM HORMONE: CPT

## 2024-05-01 PROCEDURE — 94761 N-INVAS EAR/PLS OXIMETRY MLT: CPT

## 2024-05-01 PROCEDURE — 36415 COLL VENOUS BLD VENIPUNCTURE: CPT | Performed by: NURSE PRACTITIONER

## 2024-05-01 PROCEDURE — 36415 COLL VENOUS BLD VENIPUNCTURE: CPT

## 2024-05-01 PROCEDURE — 85025 COMPLETE CBC W/AUTO DIFF WBC: CPT | Performed by: NURSE PRACTITIONER

## 2024-05-01 PROCEDURE — 93010 ELECTROCARDIOGRAM REPORT: CPT | Mod: ,,, | Performed by: GENERAL PRACTICE

## 2024-05-01 PROCEDURE — 99285 EMERGENCY DEPT VISIT HI MDM: CPT | Mod: 25

## 2024-05-01 PROCEDURE — 93005 ELECTROCARDIOGRAM TRACING: CPT

## 2024-05-01 PROCEDURE — S4991 NICOTINE PATCH NONLEGEND: HCPCS

## 2024-05-01 PROCEDURE — 99900035 HC TECH TIME PER 15 MIN (STAT)

## 2024-05-01 PROCEDURE — 80053 COMPREHEN METABOLIC PANEL: CPT | Performed by: NURSE PRACTITIONER

## 2024-05-01 PROCEDURE — 96374 THER/PROPH/DIAG INJ IV PUSH: CPT

## 2024-05-01 PROCEDURE — 63600175 PHARM REV CODE 636 W HCPCS

## 2024-05-01 PROCEDURE — 81003 URINALYSIS AUTO W/O SCOPE: CPT | Mod: 59

## 2024-05-01 RX ORDER — THIAMINE HCL 100 MG
100 TABLET ORAL DAILY
Status: DISCONTINUED | OUTPATIENT
Start: 2024-05-02 | End: 2024-05-02 | Stop reason: HOSPADM

## 2024-05-01 RX ORDER — CHLORDIAZEPOXIDE HYDROCHLORIDE 25 MG/1
25 CAPSULE, GELATIN COATED ORAL
Status: DISCONTINUED | OUTPATIENT
Start: 2024-05-05 | End: 2024-05-02

## 2024-05-01 RX ORDER — CHLORDIAZEPOXIDE HYDROCHLORIDE 25 MG/1
50 CAPSULE, GELATIN COATED ORAL
Status: DISCONTINUED | OUTPATIENT
Start: 2024-05-01 | End: 2024-05-02

## 2024-05-01 RX ORDER — LANOLIN ALCOHOL/MO/W.PET/CERES
800 CREAM (GRAM) TOPICAL
Status: DISCONTINUED | OUTPATIENT
Start: 2024-05-01 | End: 2024-05-02 | Stop reason: HOSPADM

## 2024-05-01 RX ORDER — ACETAMINOPHEN 325 MG/1
650 TABLET ORAL EVERY 4 HOURS PRN
Status: DISCONTINUED | OUTPATIENT
Start: 2024-05-01 | End: 2024-05-02 | Stop reason: HOSPADM

## 2024-05-01 RX ORDER — FOLIC ACID 1 MG/1
1 TABLET ORAL DAILY
Status: DISCONTINUED | OUTPATIENT
Start: 2024-05-02 | End: 2024-05-02 | Stop reason: HOSPADM

## 2024-05-01 RX ORDER — GLUCAGON 1 MG
1 KIT INJECTION
Status: DISCONTINUED | OUTPATIENT
Start: 2024-05-01 | End: 2024-05-02 | Stop reason: HOSPADM

## 2024-05-01 RX ORDER — IBUPROFEN 200 MG
24 TABLET ORAL
Status: DISCONTINUED | OUTPATIENT
Start: 2024-05-01 | End: 2024-05-02 | Stop reason: HOSPADM

## 2024-05-01 RX ORDER — ALUMINUM HYDROXIDE, MAGNESIUM HYDROXIDE, AND SIMETHICONE 1200; 120; 1200 MG/30ML; MG/30ML; MG/30ML
30 SUSPENSION ORAL 4 TIMES DAILY PRN
Status: DISCONTINUED | OUTPATIENT
Start: 2024-05-01 | End: 2024-05-02 | Stop reason: HOSPADM

## 2024-05-01 RX ORDER — CHLORDIAZEPOXIDE HYDROCHLORIDE 25 MG/1
100 CAPSULE, GELATIN COATED ORAL ONCE
Status: COMPLETED | OUTPATIENT
Start: 2024-05-01 | End: 2024-05-01

## 2024-05-01 RX ORDER — NALOXONE HCL 0.4 MG/ML
0.02 VIAL (ML) INJECTION
Status: DISCONTINUED | OUTPATIENT
Start: 2024-05-01 | End: 2024-05-02 | Stop reason: HOSPADM

## 2024-05-01 RX ORDER — TALC
6 POWDER (GRAM) TOPICAL NIGHTLY PRN
Status: DISCONTINUED | OUTPATIENT
Start: 2024-05-01 | End: 2024-05-02 | Stop reason: HOSPADM

## 2024-05-01 RX ORDER — SIMETHICONE 80 MG
1 TABLET,CHEWABLE ORAL 4 TIMES DAILY PRN
Status: DISCONTINUED | OUTPATIENT
Start: 2024-05-01 | End: 2024-05-02 | Stop reason: HOSPADM

## 2024-05-01 RX ORDER — CHLORDIAZEPOXIDE HYDROCHLORIDE 25 MG/1
25 CAPSULE, GELATIN COATED ORAL
Status: DISCONTINUED | OUTPATIENT
Start: 2024-05-04 | End: 2024-05-02

## 2024-05-01 RX ORDER — ONDANSETRON HYDROCHLORIDE 2 MG/ML
8 INJECTION, SOLUTION INTRAVENOUS EVERY 8 HOURS PRN
Status: DISCONTINUED | OUTPATIENT
Start: 2024-05-01 | End: 2024-05-02 | Stop reason: HOSPADM

## 2024-05-01 RX ORDER — CHLORDIAZEPOXIDE HYDROCHLORIDE 25 MG/1
25 CAPSULE, GELATIN COATED ORAL
Status: DISCONTINUED | OUTPATIENT
Start: 2024-05-03 | End: 2024-05-02

## 2024-05-01 RX ORDER — LORAZEPAM 2 MG/ML
2 INJECTION INTRAMUSCULAR EVERY 4 HOURS PRN
Status: DISCONTINUED | OUTPATIENT
Start: 2024-05-01 | End: 2024-05-02 | Stop reason: HOSPADM

## 2024-05-01 RX ORDER — CHLORDIAZEPOXIDE HYDROCHLORIDE 25 MG/1
25 CAPSULE, GELATIN COATED ORAL
Status: DISCONTINUED | OUTPATIENT
Start: 2024-05-06 | End: 2024-05-02

## 2024-05-01 RX ORDER — SODIUM CHLORIDE 0.9 % (FLUSH) 0.9 %
10 SYRINGE (ML) INJECTION EVERY 12 HOURS PRN
Status: DISCONTINUED | OUTPATIENT
Start: 2024-05-01 | End: 2024-05-02 | Stop reason: HOSPADM

## 2024-05-01 RX ORDER — PROCHLORPERAZINE EDISYLATE 5 MG/ML
5 INJECTION INTRAMUSCULAR; INTRAVENOUS EVERY 6 HOURS PRN
Status: DISCONTINUED | OUTPATIENT
Start: 2024-05-01 | End: 2024-05-02 | Stop reason: HOSPADM

## 2024-05-01 RX ORDER — IPRATROPIUM BROMIDE AND ALBUTEROL SULFATE 2.5; .5 MG/3ML; MG/3ML
3 SOLUTION RESPIRATORY (INHALATION) EVERY 6 HOURS PRN
Status: DISCONTINUED | OUTPATIENT
Start: 2024-05-01 | End: 2024-05-02 | Stop reason: HOSPADM

## 2024-05-01 RX ORDER — CHLORDIAZEPOXIDE HYDROCHLORIDE 25 MG/1
25 CAPSULE, GELATIN COATED ORAL
Status: DISCONTINUED | OUTPATIENT
Start: 2024-05-01 | End: 2024-05-01 | Stop reason: SDUPTHER

## 2024-05-01 RX ORDER — CHLORDIAZEPOXIDE HYDROCHLORIDE 25 MG/1
50 CAPSULE, GELATIN COATED ORAL
Status: DISCONTINUED | OUTPATIENT
Start: 2024-05-02 | End: 2024-05-02

## 2024-05-01 RX ORDER — PANTOPRAZOLE SODIUM 40 MG/1
40 TABLET, DELAYED RELEASE ORAL DAILY
Status: DISCONTINUED | OUTPATIENT
Start: 2024-05-01 | End: 2024-05-02 | Stop reason: HOSPADM

## 2024-05-01 RX ORDER — IBUPROFEN 200 MG
16 TABLET ORAL
Status: DISCONTINUED | OUTPATIENT
Start: 2024-05-01 | End: 2024-05-02 | Stop reason: HOSPADM

## 2024-05-01 RX ORDER — IBUPROFEN 200 MG
1 TABLET ORAL DAILY
Status: DISCONTINUED | OUTPATIENT
Start: 2024-05-01 | End: 2024-05-02 | Stop reason: HOSPADM

## 2024-05-01 RX ADMIN — CHLORDIAZEPOXIDE HYDROCHLORIDE 50 MG: 25 CAPSULE ORAL at 11:05

## 2024-05-01 RX ADMIN — CHLORDIAZEPOXIDE HYDROCHLORIDE 100 MG: 25 CAPSULE ORAL at 05:05

## 2024-05-01 RX ADMIN — PANTOPRAZOLE SODIUM 40 MG: 40 TABLET, DELAYED RELEASE ORAL at 05:05

## 2024-05-01 RX ADMIN — FOLIC ACID: 5 INJECTION, SOLUTION INTRAMUSCULAR; INTRAVENOUS; SUBCUTANEOUS at 05:05

## 2024-05-01 RX ADMIN — Medication 1 PATCH: at 05:05

## 2024-05-01 NOTE — ED NOTES
Report called to Shazia CAMPBELL on PCU.  Pt transferring to [facility] via  on tele [phone].

## 2024-05-01 NOTE — ED PROVIDER NOTES
Encounter Date: 5/1/2024       History     Chief Complaint   Patient presents with    Shortness of Breath     Patient states that he is SOB, blurry vision and he has high blood pressure, chest pressure      Patient is a 28 y.o. male who presents to the ED 05/01/2024 with a chief complaint of Chest pain and shortness of breath.  He states it started at approximately 7:00 a.m..  He states he has had this for months or longer.  He states last time he had it was 15 days ago.  He states he has not sure what makes it worse or better.  He states it usually goes away after several hours.  States he will feel palpitations and occasionally get blurred vision.  He denies any blurred vision now.  He reports some radiating numbness down his left arm when it occurs at times.  He currently is not having any numbness or weakness in his extremities.  He is a daily alcohol drinker.  He states he drinks approximately 12 beers a day.  He states he last drank last night.  He thinks it maybe anxiety but does not take any medication for this.  He states he was given some pills previously but did not take them.  Does not have any other medical problems or allergies and does not take any other daily medications.  Patient denies any family history of heart disease.  He does smoke 3-4 packs of cigarettes a day since he was 10 years old.             Review of patient's allergies indicates:  No Known Allergies  No past medical history on file.  No past surgical history on file.  No family history on file.  Social History     Tobacco Use    Smoking status: Every Day     Current packs/day: 0.50     Types: Cigarettes    Smokeless tobacco: Never   Substance Use Topics    Alcohol use: Yes     Comment: soc     Review of Systems   Constitutional:  Negative for chills and fever.   HENT:  Negative for sore throat.    Respiratory:  Positive for shortness of breath. Negative for cough and chest tightness.    Cardiovascular:  Positive for chest pain.  Negative for leg swelling.   Gastrointestinal:  Negative for abdominal pain.   Genitourinary:  Negative for dysuria.   Musculoskeletal:  Negative for arthralgias and myalgias.   Skin:  Negative for rash and wound.   Neurological:  Negative for syncope.   Hematological:  Does not bruise/bleed easily.   Psychiatric/Behavioral:  The patient is nervous/anxious.        Physical Exam     Initial Vitals [05/01/24 1210]   BP Pulse Resp Temp SpO2   (!) 144/86 80 20 98 °F (36.7 °C) 98 %      MAP       --         Physical Exam    Nursing note and vitals reviewed.  Constitutional: He appears well-developed and well-nourished.   HENT:   Head: Normocephalic and atraumatic.   Eyes: Conjunctivae are normal. Pupils are equal, round, and reactive to light. Right eye exhibits no discharge. Left eye exhibits no discharge.   Neck: Neck supple.   Normal range of motion.  Cardiovascular:  Normal rate, regular rhythm, normal heart sounds and intact distal pulses.           Pulmonary/Chest: Breath sounds normal.   Abdominal: Abdomen is soft. Bowel sounds are normal.   Musculoskeletal:         General: Normal range of motion.      Cervical back: Normal range of motion and neck supple.     Neurological: He is alert and oriented to person, place, and time. He has normal strength. No sensory deficit.   No focal neurological deficits noted. Cranial nerves III-XII grossly intact. Equal rapid alternating movements noted.       Skin: Skin is warm and dry.   Psychiatric: His mood appears anxious.         ED Course   Procedures  Labs Reviewed   CBC W/ AUTO DIFFERENTIAL - Abnormal; Notable for the following components:       Result Value    MCH 31.2 (*)     All other components within normal limits   COMPREHENSIVE METABOLIC PANEL - Abnormal; Notable for the following components:    CO2 21 (*)     Glucose 116 (*)     ALT 79 (*)     All other components within normal limits   TROPONIN I   D DIMER, QUANTITATIVE   ALCOHOL,MEDICAL (ETHANOL)           Imaging Results              X-Ray Chest PA And Lateral (Final result)  Result time 05/01/24 14:40:55      Final result by Gorge Lucas MD (05/01/24 14:40:55)                   Impression:      Negative chest.      Electronically signed by: Gorge Lucas MD  Date:    05/01/2024  Time:    14:40               Narrative:    EXAMINATION:  XR CHEST PA AND LATERAL    CLINICAL HISTORY:  Chest Pain;    TECHNIQUE:  PA and lateral views of the chest were performed.    COMPARISON:  10/23/2023    FINDINGS:  The cardiomediastinal silhouette is within normal limits.  The lungs are well expanded without consolidation or pleural effusion.                                       Medications   chlordiazepoxide capsule 25 mg (has no administration in time range)     Medical Decision Making  Amount and/or Complexity of Data Reviewed  Labs: ordered.  Radiology: ordered.         APC / Resident Notes:   Patient is a 28 y.o. male who presents to the ED 05/01/2024 with a chief complaint of Chest pain.   EKG shows normal sinus rhythm with no ST elevation or depression.  Patient does however have new T-wave inversions in leads III and AVF.  Initial troponin normal.  Low suspicion for ACS however given new EKG changes with persistent chest pain will admit patient for observation. He is agreeable to this. Vital signs stable. CXR without acute findings.  I do not think any pneumonia or pneumothorax.  D-dimer normal.  I do not think PE.  I do not think other emergent process such as dissection.  No evidence of any acute infectious process requiring antibiotics.  Labs are remarkable.  Patient has a known history of alcoholism.  He has not appear acutely intoxicated or in acute withdrawal at this time.  All findings and plan of care discussed with patient who is agreeable.  Case also discussed with hospital medicine team is accepting of this admission.  Case also discussed with Dr. Mooney who also evaluated patient is agreeable  plan of care. Due to language barrier, an  was present during the history-taking and subsequent discussion (and for part of the physical exam) with this patient.                 ED Course as of 05/01/24 1601   Wed May 01, 2024   1244 EKG:  NSR, rate of 76, normal intervals and axis.  T-wave inversions in III and aVF new compared to last prior.  No significant ST elevation or depression.  (Independently interpreted by me)   [MR]   1435 CXR:  NAD. (Independently interpreted by me) [MR]      ED Course User Index  [MR] Taye Mooney MD               Medical Decision Making:   Differential Diagnosis:   ACS  PE  Anxiety             Clinical Impression:  Final diagnoses:  [R07.9] Chest pain          ED Disposition Condition    Admit Stable                Janelle Jackson NP  05/01/24 1601

## 2024-05-02 ENCOUNTER — TELEPHONE (OUTPATIENT)
Dept: CARDIOLOGY | Facility: CLINIC | Age: 29
End: 2024-05-02

## 2024-05-02 VITALS
BODY MASS INDEX: 31.83 KG/M2 | WEIGHT: 235 LBS | DIASTOLIC BLOOD PRESSURE: 80 MMHG | TEMPERATURE: 98 F | SYSTOLIC BLOOD PRESSURE: 116 MMHG | OXYGEN SATURATION: 97 % | RESPIRATION RATE: 17 BRPM | HEIGHT: 72 IN | HEART RATE: 57 BPM

## 2024-05-02 PROBLEM — R07.9 CHEST PAIN: Status: ACTIVE | Noted: 2024-05-02

## 2024-05-02 PROBLEM — F17.210 TOBACCO DEPENDENCE DUE TO CIGARETTES: Status: ACTIVE | Noted: 2024-05-02

## 2024-05-02 PROBLEM — F10.10 ALCOHOL ABUSE: Status: ACTIVE | Noted: 2024-05-02

## 2024-05-02 LAB
ALBUMIN SERPL BCP-MCNC: 3.7 G/DL (ref 3.5–5.2)
ALP SERPL-CCNC: 79 U/L (ref 55–135)
ALT SERPL W/O P-5'-P-CCNC: 65 U/L (ref 10–44)
ANION GAP SERPL CALC-SCNC: 11 MMOL/L (ref 8–16)
AORTIC ROOT ANNULUS: 2.68 CM
AORTIC VALVE CUSP SEPERATION: 2.02 CM
ASCENDING AORTA: 2.57 CM
AST SERPL-CCNC: 26 U/L (ref 10–40)
AV INDEX (PROSTH): 1.02
AV MEAN GRADIENT: 3 MMHG
AV PEAK GRADIENT: 8 MMHG
AV VALVE AREA BY VELOCITY RATIO: 3.19 CM²
AV VALVE AREA: 3.79 CM²
AV VELOCITY RATIO: 0.86
BASOPHILS # BLD AUTO: 0.06 K/UL (ref 0–0.2)
BASOPHILS NFR BLD: 0.7 % (ref 0–1.9)
BILIRUB SERPL-MCNC: 0.3 MG/DL (ref 0.1–1)
BSA FOR ECHO PROCEDURE: 2.33 M2
BUN SERPL-MCNC: 18 MG/DL (ref 6–20)
CALCIUM SERPL-MCNC: 9.2 MG/DL (ref 8.7–10.5)
CHLORIDE SERPL-SCNC: 108 MMOL/L (ref 95–110)
CHOLEST SERPL-MCNC: 215 MG/DL (ref 120–199)
CHOLEST/HDLC SERPL: 5.7 {RATIO} (ref 2–5)
CO2 SERPL-SCNC: 23 MMOL/L (ref 23–29)
CREAT SERPL-MCNC: 1 MG/DL (ref 0.5–1.4)
CV ECHO LV RWT: 0.52 CM
CV PHARM DOSE: 0.4 MG
CV STRESS BASE HR: 57 BPM
DIASTOLIC BLOOD PRESSURE: 88 MMHG
DIFFERENTIAL METHOD BLD: ABNORMAL
DOP CALC AO PEAK VEL: 1.39 M/S
DOP CALC AO VTI: 24.1 CM
DOP CALC LVOT AREA: 3.7 CM2
DOP CALC LVOT DIAMETER: 2.18 CM
DOP CALC LVOT PEAK VEL: 1.19 M/S
DOP CALC LVOT STROKE VOLUME: 91.4 CM3
DOP CALC MV VTI: 31.7 CM
DOP CALCLVOT PEAK VEL VTI: 24.5 CM
E WAVE DECELERATION TIME: 169.56 MSEC
E/A RATIO: 1.94
E/E' RATIO: 6.36 M/S
ECHO LV POSTERIOR WALL: 1.19 CM (ref 0.6–1.1)
EOSINOPHIL # BLD AUTO: 0.4 K/UL (ref 0–0.5)
EOSINOPHIL NFR BLD: 4 % (ref 0–8)
ERYTHROCYTE [DISTWIDTH] IN BLOOD BY AUTOMATED COUNT: 12.4 % (ref 11.5–14.5)
EST. GFR  (NO RACE VARIABLE): >60 ML/MIN/1.73 M^2
FRACTIONAL SHORTENING: 32 % (ref 28–44)
GLUCOSE SERPL-MCNC: 109 MG/DL (ref 70–110)
HCT VFR BLD AUTO: 44.7 % (ref 40–54)
HDLC SERPL-MCNC: 38 MG/DL (ref 40–75)
HDLC SERPL: 17.7 % (ref 20–50)
HGB BLD-MCNC: 15.4 G/DL (ref 14–18)
IMM GRANULOCYTES # BLD AUTO: 0.04 K/UL (ref 0–0.04)
IMM GRANULOCYTES NFR BLD AUTO: 0.5 % (ref 0–0.5)
INTERVENTRICULAR SEPTUM: 1.18 CM (ref 0.6–1.1)
IVRT: 142.72 MSEC
LA MAJOR: 5.1 CM
LDLC SERPL CALC-MCNC: 108.8 MG/DL (ref 63–159)
LEFT ATRIUM VOLUME INDEX MOD: 20.8 ML/M2
LEFT ATRIUM VOLUME MOD: 47.43 CM3
LEFT INTERNAL DIMENSION IN SYSTOLE: 3.11 CM (ref 2.1–4)
LEFT VENTRICLE DIASTOLIC VOLUME INDEX: 41.59 ML/M2
LEFT VENTRICLE DIASTOLIC VOLUME: 94.82 ML
LEFT VENTRICLE MASS INDEX: 87 G/M2
LEFT VENTRICLE SYSTOLIC VOLUME INDEX: 16.8 ML/M2
LEFT VENTRICLE SYSTOLIC VOLUME: 38.25 ML
LEFT VENTRICULAR INTERNAL DIMENSION IN DIASTOLE: 4.55 CM (ref 3.5–6)
LEFT VENTRICULAR MASS: 197.93 G
LIPASE SERPL-CCNC: 17 U/L (ref 4–60)
LV LATERAL E/E' RATIO: 5.38 M/S
LV SEPTAL E/E' RATIO: 7.78 M/S
LVOT MG: 3.05 MMHG
LVOT MV: 0.81 CM/S
LYMPHOCYTES # BLD AUTO: 2.9 K/UL (ref 1–4.8)
LYMPHOCYTES NFR BLD: 33 % (ref 18–48)
MCH RBC QN AUTO: 31.5 PG (ref 27–31)
MCHC RBC AUTO-ENTMCNC: 34.5 G/DL (ref 32–36)
MCV RBC AUTO: 91 FL (ref 82–98)
MONOCYTES # BLD AUTO: 0.7 K/UL (ref 0.3–1)
MONOCYTES NFR BLD: 8.5 % (ref 4–15)
MV MEAN GRADIENT: 1 MMHG
MV PEAK A VEL: 0.36 M/S
MV PEAK E VEL: 0.7 M/S
MV PEAK GRADIENT: 4 MMHG
MV STENOSIS PRESSURE HALF TIME: 53.77 MS
MV VALVE AREA BY CONTINUITY EQUATION: 2.88 CM2
MV VALVE AREA P 1/2 METHOD: 4.09 CM2
NEUTROPHILS # BLD AUTO: 4.6 K/UL (ref 1.8–7.7)
NEUTROPHILS NFR BLD: 53.3 % (ref 38–73)
NONHDLC SERPL-MCNC: 177 MG/DL
NRBC BLD-RTO: 0 /100 WBC
OHS CV CPX 85 PERCENT MAX PREDICTED HEART RATE MALE: 162
OHS CV CPX MAX PREDICTED HEART RATE: 190
OHS CV CPX PATIENT IS FEMALE: 0
OHS CV CPX PATIENT IS MALE: 1
OHS CV CPX PEAK DIASTOLIC BLOOD PRESSURE: 71 MMHG
OHS CV CPX PEAK HEAR RATE: 83 BPM
OHS CV CPX PEAK RATE PRESSURE PRODUCT: NORMAL
OHS CV CPX PEAK SYSTOLIC BLOOD PRESSURE: 155 MMHG
OHS CV CPX PERCENT MAX PREDICTED HEART RATE ACHIEVED: 44
OHS CV CPX RATE PRESSURE PRODUCT PRESENTING: 8493
OHS CV RV/LV RATIO: 0.55 CM
OHS LV EJECTION FRACTION SIMPSONS BIPLANE MOD: 61 %
PISA TR MAX VEL: 2.66 M/S
PLATELET # BLD AUTO: 239 K/UL (ref 150–450)
PMV BLD AUTO: 12.1 FL (ref 9.2–12.9)
POTASSIUM SERPL-SCNC: 4.1 MMOL/L (ref 3.5–5.1)
PROT SERPL-MCNC: 6.6 G/DL (ref 6–8.4)
PV MV: 0.9 M/S
PV PEAK GRADIENT: 6 MMHG
PV PEAK VELOCITY: 1.18 M/S
RA MAJOR: 4.19 CM
RA PRESSURE ESTIMATED: 3 MMHG
RBC # BLD AUTO: 4.89 M/UL (ref 4.6–6.2)
RIGHT VENTRICULAR END-DIASTOLIC DIMENSION: 2.5 CM
RIGHT VENTRICULAR LENGTH IN DIASTOLE (APICAL 4-CHAMBER VIEW): 6.28 CM
RV TB RVSP: 6 MMHG
RV TISSUE DOPPLER FREE WALL SYSTOLIC VELOCITY 1 (APICAL 4 CHAMBER VIEW): 12.55 CM/S
SODIUM SERPL-SCNC: 142 MMOL/L (ref 136–145)
STJ: 2.11 CM
SYSTOLIC BLOOD PRESSURE: 149 MMHG
TDI LATERAL: 0.13 M/S
TDI SEPTAL: 0.09 M/S
TDI: 0.11 M/S
TR MAX PG: 28 MMHG
TRICUSPID ANNULAR PLANE SYSTOLIC EXCURSION: 2.38 CM
TRIGL SERPL-MCNC: 341 MG/DL (ref 30–150)
TSH SERPL DL<=0.005 MIU/L-ACNC: 1.34 UIU/ML (ref 0.4–4)
TV REST PULMONARY ARTERY PRESSURE: 31 MMHG
WBC # BLD AUTO: 8.7 K/UL (ref 3.9–12.7)
Z-SCORE OF LEFT VENTRICULAR DIMENSION IN END DIASTOLE: -6.35
Z-SCORE OF LEFT VENTRICULAR DIMENSION IN END SYSTOLE: -4.05

## 2024-05-02 PROCEDURE — 80053 COMPREHEN METABOLIC PANEL: CPT

## 2024-05-02 PROCEDURE — 80061 LIPID PANEL: CPT

## 2024-05-02 PROCEDURE — G0378 HOSPITAL OBSERVATION PER HR: HCPCS

## 2024-05-02 PROCEDURE — 99900035 HC TECH TIME PER 15 MIN (STAT)

## 2024-05-02 PROCEDURE — 36415 COLL VENOUS BLD VENIPUNCTURE: CPT | Performed by: NURSE PRACTITIONER

## 2024-05-02 PROCEDURE — 25000003 PHARM REV CODE 250

## 2024-05-02 PROCEDURE — A9502 TC99M TETROFOSMIN: HCPCS | Performed by: HOSPITALIST

## 2024-05-02 PROCEDURE — 99214 OFFICE O/P EST MOD 30 MIN: CPT | Mod: 25,,, | Performed by: INTERNAL MEDICINE

## 2024-05-02 PROCEDURE — 83690 ASSAY OF LIPASE: CPT | Performed by: NURSE PRACTITIONER

## 2024-05-02 PROCEDURE — 94761 N-INVAS EAR/PLS OXIMETRY MLT: CPT

## 2024-05-02 PROCEDURE — 36415 COLL VENOUS BLD VENIPUNCTURE: CPT

## 2024-05-02 PROCEDURE — 85025 COMPLETE CBC W/AUTO DIFF WBC: CPT

## 2024-05-02 PROCEDURE — 25000003 PHARM REV CODE 250: Performed by: NURSE PRACTITIONER

## 2024-05-02 PROCEDURE — 63600175 PHARM REV CODE 636 W HCPCS: Performed by: HOSPITALIST

## 2024-05-02 RX ORDER — DIAZEPAM 2 MG/1
2 TABLET ORAL EVERY 8 HOURS
Qty: 90 TABLET | Refills: 0 | Status: SHIPPED | OUTPATIENT
Start: 2024-05-02 | End: 2024-06-01

## 2024-05-02 RX ORDER — PANTOPRAZOLE SODIUM 40 MG/1
40 TABLET, DELAYED RELEASE ORAL DAILY
Qty: 90 TABLET | Refills: 3 | Status: SHIPPED | OUTPATIENT
Start: 2024-05-02 | End: 2025-05-02

## 2024-05-02 RX ORDER — ASPIRIN 325 MG
325 TABLET ORAL DAILY
Status: DISCONTINUED | OUTPATIENT
Start: 2024-05-02 | End: 2024-05-02 | Stop reason: HOSPADM

## 2024-05-02 RX ORDER — DIAZEPAM 2 MG/1
2 TABLET ORAL EVERY 8 HOURS
Status: DISCONTINUED | OUTPATIENT
Start: 2024-05-02 | End: 2024-05-02

## 2024-05-02 RX ORDER — LANOLIN ALCOHOL/MO/W.PET/CERES
100 CREAM (GRAM) TOPICAL DAILY
Qty: 30 TABLET | Refills: 11 | Status: SHIPPED | OUTPATIENT
Start: 2024-05-02 | End: 2025-05-02

## 2024-05-02 RX ORDER — DIAZEPAM 5 MG/1
5 TABLET ORAL EVERY 8 HOURS
Status: DISCONTINUED | OUTPATIENT
Start: 2024-05-02 | End: 2024-05-02 | Stop reason: HOSPADM

## 2024-05-02 RX ORDER — REGADENOSON 0.08 MG/ML
0.4 INJECTION, SOLUTION INTRAVENOUS ONCE
Status: COMPLETED | OUTPATIENT
Start: 2024-05-02 | End: 2024-05-02

## 2024-05-02 RX ORDER — GLUCOSAM/CHONDRO/HERB 149/HYAL 750-100 MG
1 TABLET ORAL DAILY
Status: DISCONTINUED | OUTPATIENT
Start: 2024-05-02 | End: 2024-05-02 | Stop reason: HOSPADM

## 2024-05-02 RX ADMIN — TETROFOSMIN 31 MILLICURIE: 1.38 INJECTION, POWDER, LYOPHILIZED, FOR SOLUTION INTRAVENOUS at 11:05

## 2024-05-02 RX ADMIN — TETROFOSMIN 12.6 MILLICURIE: 1.38 INJECTION, POWDER, LYOPHILIZED, FOR SOLUTION INTRAVENOUS at 10:05

## 2024-05-02 RX ADMIN — DIAZEPAM 5 MG: 5 TABLET ORAL at 01:05

## 2024-05-02 RX ADMIN — CHLORDIAZEPOXIDE HYDROCHLORIDE 50 MG: 25 CAPSULE ORAL at 06:05

## 2024-05-02 RX ADMIN — ASPIRIN 325 MG: 325 TABLET ORAL at 02:05

## 2024-05-02 RX ADMIN — REGADENOSON 0.4 MG: 0.08 INJECTION, SOLUTION INTRAVENOUS at 11:05

## 2024-05-02 NOTE — SUBJECTIVE & OBJECTIVE
No past medical history on file.    No past surgical history on file.    Review of patient's allergies indicates:  No Known Allergies    Current Facility-Administered Medications   Medication Dose Route Frequency Provider Last Rate Last Admin    acetaminophen tablet 650 mg  650 mg Oral Q4H PRN Rocio Dan NP        albuterol-ipratropium 2.5 mg-0.5 mg/3 mL nebulizer solution 3 mL  3 mL Nebulization Q6H PRN Rocio Dan NP        aluminum-magnesium hydroxide-simethicone 200-200-20 mg/5 mL suspension 30 mL  30 mL Oral QID PRN Rocio Dan, NP        aspirin tablet 325 mg  325 mg Oral Daily Rocio Dan NP        chlordiazepoxide capsule 50 mg  50 mg Oral Q6H Rocio Dan NP   50 mg at 05/01/24 2327    Followed by    chlordiazepoxide capsule 50 mg  50 mg Oral Q8H Rocio Dan NP        Followed by    [START ON 5/3/2024] chlordiazepoxide capsule 25 mg  25 mg Oral Q6H Rocio Dan NP        Followed by    [START ON 5/4/2024] chlordiazepoxide capsule 25 mg  25 mg Oral Q8H Rocio Dan NP        Followed by    [START ON 5/5/2024] chlordiazepoxide capsule 25 mg  25 mg Oral Q12H Rocio Dan NP        Followed by    [START ON 5/6/2024] chlordiazepoxide capsule 25 mg  25 mg Oral Q24H Rocio Dan NP        dextrose 10% bolus 125 mL 125 mL  12.5 g Intravenous PRN Rocio Dan, NP        dextrose 10% bolus 250 mL 250 mL  25 g Intravenous PRN Rocio Dan, NP        folic acid tablet 1 mg  1 mg Oral Daily Rocio Dan NP        glucagon (human recombinant) injection 1 mg  1 mg Intramuscular PRN Rocio Dan, NP        glucose chewable tablet 16 g  16 g Oral PRN Rocio Dan, NP        glucose chewable tablet 24 g  24 g Oral PRN Rocio Dan, NP        LORazepam injection 2 mg  2 mg Intravenous Q4H PRN Rocio Dan, NP        magnesium oxide tablet 800 mg  800 mg Oral PRN Rocio Dan, NP        magnesium oxide tablet 800 mg  800  mg Oral PRN Rocio Dan NP        melatonin tablet 6 mg  6 mg Oral Nightly PRN Rocio Dan, CAROL        multivitamin tablet  1 tablet Oral Daily Rocio Dan NP        naloxone 0.4 mg/mL injection 0.02 mg  0.02 mg Intravenous PRN Rocio Dan, CAROL        nicotine 21 mg/24 hr 1 patch  1 patch Transdermal Daily Rocio Dan NP   1 patch at 05/01/24 1730    ondansetron injection 8 mg  8 mg Intravenous Q8H PRN Rocio Dan, CAROL        pantoprazole EC tablet 40 mg  40 mg Oral Daily Rocio Dan, NP   40 mg at 05/01/24 1732    potassium bicarbonate disintegrating tablet 35 mEq  35 mEq Oral PRN Rocio Dan, NP        potassium bicarbonate disintegrating tablet 50 mEq  50 mEq Oral PRN Rocio Dan, NP        potassium bicarbonate disintegrating tablet 60 mEq  60 mEq Oral PRN Rocio Dan NP        prochlorperazine injection Soln 5 mg  5 mg Intravenous Q6H PRN Rocio Dan, CAROL        simethicone chewable tablet 80 mg  1 tablet Oral QID PRN Rocio Dan NP        sodium chloride 0.9% flush 10 mL  10 mL Intravenous Q12H PRN Rocio Dan, CAROL        thiamine tablet 100 mg  100 mg Oral Daily Rocio Dan, CAROL         Family History    None       Tobacco Use    Smoking status: Every Day     Current packs/day: 0.50     Types: Cigarettes    Smokeless tobacco: Never   Substance and Sexual Activity    Alcohol use: Yes     Comment: soc    Drug use: Not on file    Sexual activity: Not on file     Review of Systems   Respiratory:  Positive for shortness of breath.    Cardiovascular:  Positive for chest pain.   Psychiatric/Behavioral:  The patient is nervous/anxious.    All other systems reviewed and are negative.    Objective:     Vital Signs (Most Recent):  Temp: 98.7 °F (37.1 °C) (05/01/24 2318)  Pulse: 81 (05/01/24 2325)  Resp: 16 (05/01/24 2325)  BP: 122/77 (05/01/24 2318)  SpO2: 97 % (05/01/24 2325) Vital Signs (24h Range):  Temp:  [98 °F (36.7 °C)-98.9 °F  (37.2 °C)] 98.7 °F (37.1 °C)  Pulse:  [60-81] 81  Resp:  [16-20] 16  SpO2:  [94 %-98 %] 97 %  BP: (120-144)/(62-86) 122/77     Weight: 106.6 kg (235 lb 0.2 oz)  Body mass index is 31.01 kg/m².     Physical Exam  Vitals reviewed.   Constitutional:       General: He is not in acute distress.     Appearance: Normal appearance.   HENT:      Head: Normocephalic and atraumatic.      Nose: Nose normal.      Mouth/Throat:      Mouth: Mucous membranes are dry.      Pharynx: Oropharynx is clear.   Eyes:      Extraocular Movements: Extraocular movements intact.      Pupils: Pupils are equal, round, and reactive to light.   Cardiovascular:      Rate and Rhythm: Normal rate and regular rhythm.      Pulses: Normal pulses.      Heart sounds: Normal heart sounds.   Pulmonary:      Effort: Pulmonary effort is normal.      Breath sounds: Normal breath sounds.   Abdominal:      General: Bowel sounds are normal.      Palpations: Abdomen is soft.   Musculoskeletal:         General: Normal range of motion.      Cervical back: Normal range of motion and neck supple.   Skin:     General: Skin is warm and dry.      Capillary Refill: Capillary refill takes less than 2 seconds.   Neurological:      General: No focal deficit present.      Mental Status: He is alert and oriented to person, place, and time.   Psychiatric:         Mood and Affect: Mood normal.         Behavior: Behavior normal.         Thought Content: Thought content normal.         Judgment: Judgment normal.              CRANIAL NERVES     CN III, IV, VI   Pupils are equal, round, and reactive to light.       Significant Labs: All pertinent labs within the past 24 hours have been reviewed.  CBC:   Recent Labs   Lab 05/01/24  1251   WBC 10.08   HGB 16.3   HCT 45.3        CMP:   Recent Labs   Lab 05/01/24  1251      K 3.8      CO2 21*   *   BUN 11   CREATININE 0.9   CALCIUM 9.5   PROT 7.5   ALBUMIN 4.3   BILITOT 0.8   ALKPHOS 75   AST 31   ALT 79*    ANIONGAP 10     D-Dimer: <0.19  Magnesium:   Recent Labs   Lab 05/01/24  1811   MG 2.0     Troponin:   Recent Labs   Lab 05/01/24  1251 05/01/24  1811 05/01/24  2241   TROPONINI 0.017 0.016 0.020     Urine Studies:   Recent Labs   Lab 05/01/24  1719   COLORU Yellow   APPEARANCEUA Clear   PHUR 6.0   SPECGRAV >1.030*   PROTEINUA Trace*   GLUCUA Negative   KETONESU Negative   BILIRUBINUA Negative   OCCULTUA Negative   NITRITE Negative   UROBILINOGEN 2.0-3.0*   LEUKOCYTESUR Negative       Significant Imaging: I have reviewed all pertinent imaging results/findings within the past 24 hours.  EXAMINATION:  XR CHEST PA AND LATERAL     CLINICAL HISTORY:  Chest Pain;     TECHNIQUE:  PA and lateral views of the chest were performed.     COMPARISON:  10/23/2023     FINDINGS:  The cardiomediastinal silhouette is within normal limits.  The lungs are well expanded without consolidation or pleural effusion.     Impression:     Negative chest.        Electronically signed by:Gorge Lucas MD  Date:                                            05/01/2024  Time:                                           14:40

## 2024-05-02 NOTE — PLAN OF CARE
Problem: Adult Inpatient Plan of Care  Goal: Plan of Care Review  Outcome: Progressing  Goal: Patient-Specific Goal (Individualized)  Outcome: Progressing  Goal: Absence of Hospital-Acquired Illness or Injury  Outcome: Progressing  Goal: Optimal Comfort and Wellbeing  Outcome: Progressing  Goal: Readiness for Transition of Care  Outcome: Progressing     Problem: Fall Injury Risk  Goal: Absence of Fall and Fall-Related Injury  Outcome: Progressing     Problem: Alcohol Withdrawal  Goal: Alcohol Withdrawal Symptom Control  Outcome: Progressing  Goal: Optimal Neurologic Function  Outcome: Progressing  Goal: Readiness for Change Identified  Outcome: Progressing   Plan of care reviewed with patient,verbalized understanding.  SR on telemetry. No signs of withdrawals. No complaints of CP. Remains free from falls, injury. Instructed to call for assistance as needed ,verbalized understanding. Bed in low & locked position. Call light in reach, bed alarm on .

## 2024-05-02 NOTE — HPI
Used [facility] [id number] for HPI    Armando Ledezma is a 28 year old male with no previous medical or surgical history who presented to the emergency room for chest pain that radiated down left arm, and shortness of breath that began at 0700. Initial ED exam unremarkable with negative initial troponin except for new t-wave inversion noted on EKG. Upon admit examination patient reported that chest pain and shortness of breath has resolved and he was feeling better. Patient endorses that he has experienced this chest pain and shortness of breath in the past and it has resolved on its own. He endorses that he is currently under a lot of stress and drinks approximately 12 beers a day and smokes 3-4 packs of cigarettes daily. Patient expressed that he would like to be placed on medication to assist him in stopping to smoke cigarettes and to stop drinking alcohol. Patient admitted by hospital medicine for further evaluation and management.

## 2024-05-02 NOTE — PLAN OF CARE
Inbasket message sent to cardiology to contact pt to schedule follow up appointment.  Office information added to AVS.    Email sent to [facility] with [employer] to contact pt to schedule follow up appointment.  Office information added to AVS.

## 2024-05-02 NOTE — TELEPHONE ENCOUNTER
----- Message from Mimi Cohen RN sent at 5/2/2024 10:11 AM CDT -----  Regarding: Hospital follow up  Good morning,    Pt to be discharged from [facility], was seen by Dr. Jones while admitted.  Please contact pt to schedule follow up appointment.    Thank you,    Mimi

## 2024-05-02 NOTE — HOSPITAL COURSE
28 y.o. M patient presented for evaluation of chest pain, was found to have abn EKG. Cardiology consult, 2D echo was obtained and NST performed. Cholesterol slighly elevated at 215 but trig 341. Cardiology recommended aggressive risk factor modification, add PPI, fish oil capsules and tobacco and alcohol cessation. Can consider statin when transaminase improves. NST negative for evidence of acute ischemia. Chest pain free. Due to alcohol abuse disorder patient requested cessation services. Ambulatory referral to Behavior health ordered. Resources to [facility] substance abuse services given to patient. Patient referred to [facility] services for PCP establishment due to uninsured status. Case Management explained that payment is based on income. Patient was ordered diazepam, omega 3, Protonix, and thiamine at discharge, referred to outpatient services for cessation mediation management. Diazepam escibed as q8h for 90 days. Order retracted via telephone by this presciber -through [facility] pharmacist - amended to q8h prn (disp 15 tabs). He was instructed to call [facility] for FU appointment if they have not reached out to him in 2-3 days. Also call [facility] for substance abuse cessation services. At the time of discharge the patient had not had a drink since Monday April 29, 2024 per his report. All discharge instructions explained to patient who verbalized understanding.

## 2024-05-02 NOTE — H&P
[facility] Medicine  History & Physical    Patient Name: Armando Lowe  MRN: [id number]  Patient Class: OP- Observation  Admission Date: 5/1/2024  Attending Physician: Georgia Ocampo MD   Primary Care Provider: Maya Primary Doctor         Patient information was obtained from patient, past medical records, and ER records.     Subjective:     Principal Problem:Chest pain    Chief Complaint:   Chief Complaint   Patient presents with    Shortness of Breath     Patient states that he is SOB, blurry vision and he has high blood pressure, chest pressure         HPI: Used Stratus Raven [id number] for HPI    Armando Ledezma is a 28 year old male with no previous medical or surgical history who presented to the emergency room for chest pain that radiated down left arm, and shortness of breath that began at 0700. Initial ED exam unremarkable with negative initial troponin except for new t-wave inversion noted on EKG. Upon admit examination patient reported that chest pain and shortness of breath has resolved and he was feeling better. Patient endorses that he has experienced this chest pain and shortness of breath in the past and it has resolved on its own. He endorses that he is currently under a lot of stress and drinks approximately 12 beers a day and smokes 3-4 packs of cigarettes daily. Patient expressed that he would like to be placed on medication to assist him in stopping to smoke cigarettes and to stop drinking alcohol. Patient admitted by hospital medicine for further evaluation and management.     No past medical history on file.    No past surgical history on file.    Review of patient's allergies indicates:  No Known Allergies    Current Facility-Administered Medications   Medication Dose Route Frequency Provider Last Rate Last Admin    acetaminophen tablet 650 mg  650 mg Oral Q4H PRN Rocio Dan, NP        albuterol-ipratropium 2.5 mg-0.5 mg/3 mL nebulizer  solution 3 mL  3 mL Nebulization Q6H PRN Rocio Dan, NP        aluminum-magnesium hydroxide-simethicone 200-200-20 mg/5 mL suspension 30 mL  30 mL Oral QID PRN Rocio Dan, NP        aspirin tablet 325 mg  325 mg Oral Daily RossmaseRocio may, NP        chlordiazepoxide capsule 50 mg  50 mg Oral Q6H Rocio Dan, NP   50 mg at 05/01/24 2327    Followed by    chlordiazepoxide capsule 50 mg  50 mg Oral Q8H RossmaseRocio may, NP        Followed by    [START ON 5/3/2024] chlordiazepoxide capsule 25 mg  25 mg Oral Q6H Rocio Dan NP        Followed by    [START ON 5/4/2024] chlordiazepoxide capsule 25 mg  25 mg Oral Q8H RossmaseRocio may NP        Followed by    [START ON 5/5/2024] chlordiazepoxide capsule 25 mg  25 mg Oral Q12H Rocio Dan NP        Followed by    [START ON 5/6/2024] chlordiazepoxide capsule 25 mg  25 mg Oral Q24H GhislaineseRocio may, NP        dextrose 10% bolus 125 mL 125 mL  12.5 g Intravenous PRN Rocio Dan, NP        dextrose 10% bolus 250 mL 250 mL  25 g Intravenous PRN Rocio Dan, NP        folic acid tablet 1 mg  1 mg Oral Daily GhislaineseRocio may, NP        glucagon (human recombinant) injection 1 mg  1 mg Intramuscular PRN Rocio Dan, NP        glucose chewable tablet 16 g  16 g Oral PRN GhislaineseRocio may, NP        glucose chewable tablet 24 g  24 g Oral PRN Rocio Dan, NP        LORazepam injection 2 mg  2 mg Intravenous Q4H PRN Rocio Dan, NP        magnesium oxide tablet 800 mg  800 mg Oral PRN Rocio Dan, NP        magnesium oxide tablet 800 mg  800 mg Oral PRN Rocio Dan, NP        melatonin tablet 6 mg  6 mg Oral Nightly PRN Rocio Dan, NP        multivitamin tablet  1 tablet Oral Daily Rocio Dan, NP        naloxone 0.4 mg/mL injection 0.02 mg  0.02 mg Intravenous PRN Rocio Dan, CAROL        nicotine 21 mg/24 hr 1 patch  1 patch Transdermal Daily Rocio Dan NP   1 patch at 05/01/24  1730    ondansetron injection 8 mg  8 mg Intravenous Q8H PRN LouiseRocio may N, NP        pantoprazole EC tablet 40 mg  40 mg Oral Daily Ghislaineseyadira Rocio N, NP   40 mg at 05/01/24 1732    potassium bicarbonate disintegrating tablet 35 mEq  35 mEq Oral PRN TommaseRocio may N, NP        potassium bicarbonate disintegrating tablet 50 mEq  50 mEq Oral PRN TommaRocio hicks N, NP        potassium bicarbonate disintegrating tablet 60 mEq  60 mEq Oral PRN RossmaRocio hicks N, NP        prochlorperazine injection Soln 5 mg  5 mg Intravenous Q6H PRN RossmaRocio hicks N, NP        simethicone chewable tablet 80 mg  1 tablet Oral QID PRN GhislaineRocio hicks, NP        sodium chloride 0.9% flush 10 mL  10 mL Intravenous Q12H PRN GhislaineRocio hicks N, NP        thiamine tablet 100 mg  100 mg Oral Daily RossmaRocio hicks, NP         Family History    None       Tobacco Use    Smoking status: Every Day     Current packs/day: 0.50     Types: Cigarettes    Smokeless tobacco: Never   Substance and Sexual Activity    Alcohol use: Yes     Comment: soc    Drug use: Not on file    Sexual activity: Not on file     Review of Systems   Respiratory:  Positive for shortness of breath.    Cardiovascular:  Positive for chest pain.   Psychiatric/Behavioral:  The patient is nervous/anxious.    All other systems reviewed and are negative.    Objective:     Vital Signs (Most Recent):  Temp: 98.7 °F (37.1 °C) (05/01/24 2318)  Pulse: 81 (05/01/24 2325)  Resp: 16 (05/01/24 2325)  BP: 122/77 (05/01/24 2318)  SpO2: 97 % (05/01/24 2325) Vital Signs (24h Range):  Temp:  [98 °F (36.7 °C)-98.9 °F (37.2 °C)] 98.7 °F (37.1 °C)  Pulse:  [60-81] 81  Resp:  [16-20] 16  SpO2:  [94 %-98 %] 97 %  BP: (120-144)/(62-86) 122/77     Weight: 106.6 kg (235 lb 0.2 oz)  Body mass index is 31.01 kg/m².     Physical Exam  Vitals reviewed.   Constitutional:       General: He is not in acute distress.     Appearance: Normal appearance.   HENT:      Head: Normocephalic and atraumatic.       Nose: Nose normal.      Mouth/Throat:      Mouth: Mucous membranes are dry.      Pharynx: Oropharynx is clear.   Eyes:      Extraocular Movements: Extraocular movements intact.      Pupils: Pupils are equal, round, and reactive to light.   Cardiovascular:      Rate and Rhythm: Normal rate and regular rhythm.      Pulses: Normal pulses.      Heart sounds: Normal heart sounds.   Pulmonary:      Effort: Pulmonary effort is normal.      Breath sounds: Normal breath sounds.   Abdominal:      General: Bowel sounds are normal.      Palpations: Abdomen is soft.   Musculoskeletal:         General: Normal range of motion.      Cervical back: Normal range of motion and neck supple.   Skin:     General: Skin is warm and dry.      Capillary Refill: Capillary refill takes less than 2 seconds.   Neurological:      General: No focal deficit present.      Mental Status: He is alert and oriented to person, place, and time.   Psychiatric:         Mood and Affect: Mood normal.         Behavior: Behavior normal.         Thought Content: Thought content normal.         Judgment: Judgment normal.              CRANIAL NERVES     CN III, IV, VI   Pupils are equal, round, and reactive to light.       Significant Labs: All pertinent labs within the past 24 hours have been reviewed.  CBC:   Recent Labs   Lab 05/01/24  1251   WBC 10.08   HGB 16.3   HCT 45.3        CMP:   Recent Labs   Lab 05/01/24  1251      K 3.8      CO2 21*   *   BUN 11   CREATININE 0.9   CALCIUM 9.5   PROT 7.5   ALBUMIN 4.3   BILITOT 0.8   ALKPHOS 75   AST 31   ALT 79*   ANIONGAP 10     D-Dimer: <0.19  Magnesium:   Recent Labs   Lab 05/01/24  1811   MG 2.0     Troponin:   Recent Labs   Lab 05/01/24  1251 05/01/24  1811 05/01/24  2241   TROPONINI 0.017 0.016 0.020     Urine Studies:   Recent Labs   Lab 05/01/24  1719   COLORU Yellow   APPEARANCEUA Clear   PHUR 6.0   SPECGRAV >1.030*   PROTEINUA Trace*   GLUCUA Negative   KETONESU Negative    BILIRUBINUA Negative   OCCULTUA Negative   NITRITE Negative   UROBILINOGEN 2.0-3.0*   LEUKOCYTESUR Negative       Significant Imaging: I have reviewed all pertinent imaging results/findings within the past 24 hours.  EXAMINATION:  XR CHEST PA AND LATERAL     CLINICAL HISTORY:  Chest Pain;     TECHNIQUE:  PA and lateral views of the chest were performed.     COMPARISON:  10/23/2023     FINDINGS:  The cardiomediastinal silhouette is within normal limits.  The lungs are well expanded without consolidation or pleural effusion.     Impression:     Negative chest.        Electronically signed by:Gorge Lucas MD  Date:                                            05/01/2024  Time:                                           14:40                Assessment/Plan:     * Chest pain  -Trend troponin x3  -EKG PRN chest pain or SOB  -Telemetry monitoring  -PRN oxygen   -TSH  -Fasting lipid panel  -CBC, CMP, mag, phos      Tobacco dependence due to cigarettes  Patient smokes 3-4 packs of cigarettes daily since 10 years of age.    -Nicotine patch PRN  -Smoking cessation education    Alcohol abuse  -CIWA Q 4 hours and PRN ativan for CIWA greater than 8  -Banana Bag once  -Librium Taper   -PO multivitamin, thiamine, and folate daily        VTE Risk Mitigation (From admission, onward)           Ordered     Reason for No Pharmacological VTE Prophylaxis  Once        Question:  Reasons:  Answer:  Patient is Ambulatory    05/01/24 1624     IP VTE HIGH RISK PATIENT  Once         05/01/24 1624     Place sequential compression device  Until discontinued         05/01/24 1624                         On 05/02/2024, patient should be placed in hospital observation services under my care in collaboration with Dr. Georgia Ocampo MD.           Rocio Dan NP  Department of Hospital Medicine  [facility]/Surg

## 2024-05-02 NOTE — DISCHARGE INSTRUCTIONS
Behavior Health for alcohol cessation management  [facility]  [phone] [address] [facility] [place] [address]

## 2024-05-02 NOTE — ASSESSMENT & PLAN NOTE
-Trend troponin x3  -EKG PRN chest pain or SOB  -Telemetry monitoring  -PRN oxygen   -TSH  -Fasting lipid panel  -CBC, CMP, mag, phos

## 2024-05-02 NOTE — PLAN OF CARE
Message sent to Lidia to screen pt for Medicaid as pt is presently uninsured.       05/02/24 1053   Post-Acute Status   Post-Acute Authorization Other   Other Status   (insurance)

## 2024-05-02 NOTE — DISCHARGE SUMMARY
[facility] Medicine  Discharge Summary      Patient Name: Armando Lowe  MRN: [id number]  [facility]: [id number]  Patient Class: OP- Observation  Admission Date: 5/1/2024  Hospital Length of Stay: 0 days  Discharge Date and Time:  05/02/2024 1:27 PM  Attending Physician: Georgia Ocampo MD   Discharging Provider: PORSCHE Euceda  Primary Care Provider: Maya Primary Doctor    Primary Care Team: Networked reference to record PCT     HPI:   Used Christo Raven [id number] for HPI    Armando Ledezma is a 28 year old male with no previous medical or surgical history who presented to the emergency room for chest pain that radiated down left arm, and shortness of breath that began at 0700. Initial ED exam unremarkable with negative initial troponin except for new t-wave inversion noted on EKG. Upon admit examination patient reported that chest pain and shortness of breath has resolved and he was feeling better. Patient endorses that he has experienced this chest pain and shortness of breath in the past and it has resolved on its own. He endorses that he is currently under a lot of stress and drinks approximately 12 beers a day and smokes 3-4 packs of cigarettes daily. Patient expressed that he would like to be placed on medication to assist him in stopping to smoke cigarettes and to stop drinking alcohol. Patient admitted by hospital medicine for further evaluation and management.     * No surgery found *      Hospital Course:   28 y.o. M patient presented for evaluation of chest pain, was found to have abn EKG. Cardiology consult, 2D echo was obtained and NST performed. Cholesterol slighly elevated at 215 but trig 341. Cardiology recommended aggressive risk factor modification, add PPI, fish oil capsules and tobacco and alcohol cessation. Can consider statin when transaminase improves. NST negative for evidence of acute ischemia. Chest pain free. Due to alcohol abuse disorder  patient requested cessation services. Ambulatory referral to Behavior health ordered. Resources to [facility] substance abuse services given to patient. Patient referred to [facility] services for PCP establishment due to uninsured status. Case Management explained that payment is based on income. Patient was ordered diazepam, omega 3, Protonix, and thiamine at discharge, referred to outpatient services for cessation mediation management. Diazepam escibed as q8h for 90 days. Order retracted via telephone by this presciber -through [facility] pharmacist - amended to q8h prn (disp 15 tabs). He was instructed to call [facility] for FU appointment if they have not reached out to him in 2-3 days. Also call [facility] for substance abuse cessation services. At the time of discharge the patient had not had a drink since Monday April 29, 2024 per his report. All discharge instructions explained to patient who verbalized understanding.      Goals of Care Treatment Preferences:  Code Status: Full Code      Consults:   Consults (From admission, onward)          Status Ordering Provider     Inpatient consult to Cardiology  Once        Provider:  Dylan Jones MD    Acknowledged SATYA MARCH                  Final Active Diagnoses:    Diagnosis Date Noted POA    PRINCIPAL PROBLEM:  Chest pain [R07.9] 05/02/2024 Yes    Alcohol abuse [F10.10] 05/02/2024 Yes    Tobacco dependence due to cigarettes [F17.210] 05/02/2024 Yes      Problems Resolved During this Admission:       Discharged Condition: stable    Disposition: Home or Self Care    Follow Up:   Follow-up Information       [facility] Follow up.    Why: Message sent to office to contact you to schedule follow up appointment.  If you don't hear from the office in a week, please contact office.  Contact information:  [place] [address]  [phone]               Dylan Jones MD Follow up.     Specialties: Interventional Cardiology, Cardiology  Why: Inbasket message sent to office to contact you to schedule follow up appointment.  If you don't hear from the office in a week, please contact office.  Contact information:  [address] [facility]  [facility]  [place] [address] [phone]                           Patient Instructions:      Ambulatory referral/consult to Psychiatry   Standing Status: Future   Referral Priority: Routine Referral Type: Psychiatric   Referral Reason: Specialty Services Required   Requested Specialty: Psychiatry   Number of Visits Requested: 1     Diet Cardiac     Activity as tolerated       Significant Diagnostic Studies: Labs: CMP   Recent Labs   Lab 05/01/24  1251 05/02/24  0354    142   K 3.8 4.1    108   CO2 21* 23   * 109   BUN 11 18   CREATININE 0.9 1.0   CALCIUM 9.5 9.2   PROT 7.5 6.6   ALBUMIN 4.3 3.7   BILITOT 0.8 0.3   ALKPHOS 75 79   AST 31 26   ALT 79* 65*   ANIONGAP 10 11   , CBC   Recent Labs   Lab 05/01/24  1251 05/02/24  0355   WBC 10.08 8.70   HGB 16.3 15.4   HCT 45.3 44.7    239   , Lipid Panel   Lab Results   Component Value Date    CHOL 215 (H) 05/02/2024    HDL 38 (L) 05/02/2024    LDLCALC 108.8 05/02/2024    TRIG 341 (H) 05/02/2024    CHOLHDL 17.7 (L) 05/02/2024   , Troponin   Recent Labs   Lab 05/01/24  1251 05/01/24  1811 05/01/24  2241   TROPONINI 0.017 0.016 0.020     Pending Diagnostic Studies:       Procedure Component Value Units Date/Time    NM Myocardial Perfusion Spect Multi Exer [[id number]] Resulted: 05/02/24 1049    Order Status: Sent Lab Status: In process Updated: 05/02/24 1258    Toxicology screen, urine [[id number]] Collected: 05/01/24 1719    Order Status: Sent Lab Status: No result     Specimen: Urine, Clean Catch            Medications:  Reconciled Home Medications:      Medication List        START taking these medications      diazePAM 2 MG tablet  Commonly known as: VALIUM  Take 1 tablet (2 mg  total) by mouth every 8 (eight) hours prn  Disp 15 tabs.     omega-3 fatty acids-fish oil 340-1,000 mg Cap  Take 1 capsule by mouth once daily.     pantoprazole 40 MG tablet  Commonly known as: PROTONIX  Take 1 tablet (40 mg total) by mouth once daily.     thiamine 100 MG tablet  Take 1 tablet (100 mg total) by mouth once daily.            CONTINUE taking these medications      carBAMazepine 100 MG 12 hr tablet  Commonly known as: TEGRETOL XR  Take 1 tablet (100 mg total) by mouth 2 (two) times daily. for 10 days     nystatin cream  Commonly known as: MYCOSTATIN  Apply topically 2 (two) times daily. To the penis after foreskin care is performed for 7 days              Indwelling Lines/Drains at time of discharge:   Lines/Drains/Airways       None                   Time spent on the discharge of patient: 40 minutes               Ayesha Treviño, DNP, APRN, FNP-C  [facility] Medicine  [facility]  [email or web address]

## 2024-05-02 NOTE — CARE UPDATE
05/01/24 8346   Patient Assessment/Suction   Level of Consciousness (AVPU) alert   Respiratory Effort Normal;Unlabored   Expansion/Accessory Muscles/Retractions expansion symmetric;no retractions;no use of accessory muscles   PRE-TX-O2   Device (Oxygen Therapy) room air   SpO2 97 %   Pulse Oximetry Type Intermittent   $ Pulse Oximetry - Multiple Charge Pulse Oximetry - Multiple   Pulse 81   Resp 16   Aerosol Therapy   $ Aerosol Therapy Charges PRN treatment not required  (nurse was able to aske patient in Chinese how is breathing was, he stated he was not short of breath)   Respiratory Treatment Status (SVN) PRN treatment not required   Education   $ Education Other (see comment);15 min  (pulse oximetry)

## 2024-05-02 NOTE — ASSESSMENT & PLAN NOTE
Patient smokes 3-4 packs of cigarettes daily since 10 years of age.    -Nicotine patch PRN  -Smoking cessation education

## 2024-05-02 NOTE — ASSESSMENT & PLAN NOTE
-CIWA Q 4 hours and PRN ativan for CIWA greater than 8  -Banana Bag once  -Librium Taper   -PO multivitamin, thiamine, and folate daily

## 2024-05-02 NOTE — CONSULTS
[facility]  Department of Cardiology  Consult Note      PATIENT NAME: Armando Lowe    MRN: [id number]  TODAY'S DATE: 05/02/2024  ADMIT DATE: 5/1/2024                          CONSULT REQUESTED BY: Georgia Ocampo MD    SUBJECTIVE     PRINCIPAL PROBLEM: Chest pain      REASON FOR CONSULT:    Chest pain      HPI:    28-year-old  male who speaks limited English was admitted with recurrent episodes of chest pressure in the left pectoral area.  He reports this has been going on for about 2 months duration on and off and he has some left arm numbness associated with this.  The pain occurs with varying intensity both at rest and with exertion.  Some shortness of breath is noted.  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  Claims has been a lot of stress smokes about 3-4 packs of cigarettes a day and admits to having at least 12 beers daily.  No nausea vomiting no blood in the stool or black stool noted.      Per admit providers notes    Armando Ledezma is a 28 year old male with no previous medical or surgical history who presented to the emergency room for chest pain that radiated down left arm, and shortness of breath that began at 0700. Initial ED exam unremarkable with negative initial troponin except for new t-wave inversion noted on EKG. Upon admit examination patient reported that chest pain and shortness of breath has resolved and he was feeling better. Patient endorses that he has experienced this chest pain and shortness of breath in the past and it has resolved on its own. He endorses that he is currently under a lot of stress and drinks approximately 12 beers a day and smokes 3-4 packs of cigarettes daily. Patient expressed that he would like to be placed on medication to assist him in stopping to smoke cigarettes and to stop drinking alcohol. Patient admitted by hospital medicine for further  evaluation and management.      No past medical history on file.    Review of patient's allergies indicates:  No Known Allergies    No past medical history on file.  No past surgical history on file.  Social History     Tobacco Use    Smoking status: Every Day     Current packs/day: 0.50     Types: Cigarettes    Smokeless tobacco: Never   Substance Use Topics    Alcohol use: Yes     Alcohol/week: 84.0 standard drinks of alcohol     Types: 84 Cans of beer per week     Comment: soc    Drug use: Not Currently        REVIEW OF SYSTEMS  Limited due to language barrier  CONSTITUTIONAL: Negative for chills, fatigue and fever.   EYES: No double vision, No blurred vision  NEURO: No headaches, No dizziness  RESPIRATORY:  Intermittent episodes of shortness of breath   CARDIOVASCULAR:  Chest pain as described   GI: Negative for abdominal pain, No melena, diarrhea, nausea and vomiting.   : Negative for dysuria and frequency, Negative for hematuria  SKIN: Negative for bruising, Negative for edema or discoloration noted.   ENDOCRINE: Negative for polyphagia, Negative for heat intolerance, Negative for cold intolerance  PSYCHIATRIC:  Stress, anxiety   MUSCULOSKELETAL:  Musculoskeletal pains noted in his chest as well as left arm.    OBJECTIVE     VITAL SIGNS (Most Recent)  Temp: 97.6 °F (36.4 °C) (05/02/24 1041)  Pulse: (!) 59 (05/02/24 1041)  Resp: 17 (05/02/24 1041)  BP: 118/84 (05/02/24 1041)  SpO2: 97 % (05/02/24 1041)    VENTILATION STATUS  Resp: 17 (05/02/24 1041)  SpO2: 97 % (05/02/24 1041)           I & O (Last 24H):  Intake/Output Summary (Last 24 hours) at 5/2/2024 1047  Last data filed at 5/1/2024 1923  Gross per 24 hour   Intake 240 ml   Output --   Net 240 ml       WEIGHTS  Wt Readings from Last 1 Encounters:   05/01/24 1906 106.6 kg (235 lb 0.2 oz)   05/01/24 1210 95.3 kg (210 lb)       PHYSICAL EXAM  GENERAL: well built, well nourished, well-developed in no apparent distress alert and oriented.   HEENT:  Normocephalic. Pupils normal and conjunctivae normal.  Mucous membranes normal, no cyanosis or icterus, trachea central,no pallor or icterus is noted..   NECK: No JVD. No bruit..   THYROID: Thyroid not enlarged. No nodules present..   CARDIAC: Regular rate and rhythm. S1 is normal.S2 is normal.No gallops, clicks or murmurs noted at this time.  CHEST ANATOMY: normal.   LUNGS: Clear to auscultation. No wheezing or rhonchi..   ABDOMEN: Soft no masses or organomegaly.  No abdomen pulsations or bruits.  Normal bowel sounds. No pulsations and no masses felt, No guarding or rebound.   URINARY: No tena catheter   EXTREMITIES: No cyanosis, clubbing or edema noted at this time., no calf tenderness bilaterally.   PERIPHERAL VASCULAR SYSTEM: Good palpable distal pulses.   CENTRAL NERVOUS SYSTEM: No focal motor or sensory deficits noted.   SKIN: Skin without lesions, moist, well perfused.   MUSCLE STRENGTH & TONE: No noteable weakness, atrophy or abnormal movement.     HOME MEDICATIONS:  No current facility-administered medications on file prior to encounter.     Current Outpatient Medications on File Prior to Encounter   Medication Sig Dispense Refill    carBAMazepine (TEGRETOL XR) 100 MG 12 hr tablet Take 1 tablet (100 mg total) by mouth 2 (two) times daily. for 10 days (Patient not taking: Reported on 5/1/2024) 20 tablet 0    nystatin (MYCOSTATIN) cream Apply topically 2 (two) times daily. To the penis after foreskin care is performed for 7 days 15 g 0       SCHEDULED MEDS:  Current Facility-Administered Medications   Medication Dose Route Frequency    aspirin  325 mg Oral Daily    chlordiazepoxide  50 mg Oral Q6H    Followed by    chlordiazepoxide  50 mg Oral Q8H    Followed by    [START ON 5/3/2024] chlordiazepoxide  25 mg Oral Q6H    Followed by    [START ON 5/4/2024] chlordiazepoxide  25 mg Oral Q8H    Followed by    [START ON 5/5/2024] chlordiazepoxide  25 mg Oral Q12H    Followed by    [START ON 5/6/2024]  "chlordiazepoxide  25 mg Oral Q24H    folic acid  1 mg Oral Daily    multivitamin  1 tablet Oral Daily    nicotine  1 patch Transdermal Daily    omega 3-dha-epa-fish oil  1 capsule Oral Daily    pantoprazole  40 mg Oral Daily    regadenoson  0.4 mg Intravenous Once    thiamine  100 mg Oral Daily       CONTINUOUS INFUSIONS:  Current Facility-Administered Medications   Medication Dose Route Frequency Last Rate Last Admin       PRN MEDS:  Current Facility-Administered Medications:     acetaminophen, 650 mg, Oral, Q4H PRN    albuterol-ipratropium, 3 mL, Nebulization, Q6H PRN    aluminum-magnesium hydroxide-simethicone, 30 mL, Oral, QID PRN    dextrose 10%, 12.5 g, Intravenous, PRN    dextrose 10%, 25 g, Intravenous, PRN    glucagon (human recombinant), 1 mg, Intramuscular, PRN    glucose, 16 g, Oral, PRN    glucose, 24 g, Oral, PRN    lorazepam, 2 mg, Intravenous, Q4H PRN    magnesium oxide, 800 mg, Oral, PRN    magnesium oxide, 800 mg, Oral, PRN    melatonin, 6 mg, Oral, Nightly PRN    naloxone, 0.02 mg, Intravenous, PRN    ondansetron, 8 mg, Intravenous, Q8H PRN    potassium bicarbonate, 35 mEq, Oral, PRN    potassium bicarbonate, 50 mEq, Oral, PRN    potassium bicarbonate, 60 mEq, Oral, PRN    prochlorperazine, 5 mg, Intravenous, Q6H PRN    simethicone, 1 tablet, Oral, QID PRN    sodium chloride 0.9%, 10 mL, Intravenous, Q12H PRN    LABS AND DIAGNOSTICS     CBC LAST 3 DAYS  Recent Labs   Lab 05/01/24  1251 05/02/24  0355   WBC 10.08 8.70   RBC 5.22 4.89   HGB 16.3 15.4   HCT 45.3 44.7   MCV 87 91   MCH 31.2* 31.5*   MCHC 36.0 34.5   RDW 12.2 12.4    239   MPV 11.6 12.1   GRAN 70.6  7.1 53.3  4.6   LYMPH 20.8  2.1 33.0  2.9   MONO 5.7  0.6 8.5  0.7   BASO 0.05 0.06   NRBC 0 0       COAGULATION LAST 3 DAYS  No results for input(s): "LABPT", "INR", "APTT" in the last 168 hours.    CHEMISTRY LAST 3 DAYS  Recent Labs   Lab 05/01/24  1251 05/01/24  1811 05/02/24  0354     --  142   K 3.8  --  4.1    " " --  108   CO2 21*  --  23   ANIONGAP 10  --  11   BUN 11  --  18   CREATININE 0.9  --  1.0   *  --  109   CALCIUM 9.5  --  9.2   MG  --  2.0  --    ALBUMIN 4.3  --  3.7   PROT 7.5  --  6.6   ALKPHOS 75  --  79   ALT 79*  --  65*   AST 31  --  26   BILITOT 0.8  --  0.3       CARDIAC PROFILE LAST 3 DAYS  Recent Labs   Lab 05/01/24  1251 05/01/24  1811 05/01/24  2241   TROPONINI 0.017 0.016 0.020       ENDOCRINE LAST 3 DAYS  Recent Labs   Lab 05/01/24  2241   TSH 1.340       LAST ARTERIAL BLOOD GAS  ABG  No results for input(s): "PH", "PO2", "PCO2", "HCO3", "BE" in the last 168 hours.    LAST 7 DAYS MICROBIOLOGY   Microbiology Results (last 7 days)       ** No results found for the last 168 hours. **            MOST RECENT IMAGING  X-Ray Chest PA And Lateral  Narrative: EXAMINATION:  XR CHEST PA AND LATERAL    CLINICAL HISTORY:  Chest Pain;    TECHNIQUE:  PA and lateral views of the chest were performed.    COMPARISON:  10/23/2023    FINDINGS:  The cardiomediastinal silhouette is within normal limits.  The lungs are well expanded without consolidation or pleural effusion.  Impression: Negative chest.    Electronically signed by: Gorge Lucas MD  Date:    05/01/2024  Time:    14:40      ECHOCARDIOGRAM RESULTS (last 5)  No results found for this or any previous visit.      CURRENT/PREVIOUS VISIT EKG  Results for orders placed or performed during the hospital encounter of 10/23/23   EKG 12-lead    Collection Time: 10/23/23  9:29 AM    Narrative    Test Reason : R07.9,    Vent. Rate : 077 BPM     Atrial Rate : 077 BPM     P-R Int : 132 ms          QRS Dur : 106 ms      QT Int : 398 ms       P-R-T Axes : 058 053 043 degrees     QTc Int : 450 ms    Normal sinus rhythm with sinus arrhythmia  Normal ECG  When compared with ECG of 22-OCT-2023 14:05,  No significant change was found  Confirmed by Sandra ALEXANDER, Elmer FOSTER ([id number]) on 10/25/2023 10:14:49 PM    Referred By: REJI   SELF           Confirmed By:Elmer FOSTER " Sandra ALEXANDER           ASSESSMENT/PLAN:     Active Hospital Problems    Diagnosis    *Chest pain    Alcohol abuse    Tobacco dependence due to cigarettes       ASSESSMENT & PLAN:     1. Atypical chest pains  2. Alcohol dependency   3. Tobacco dependency  4. Abnormal EKG  5. Severe dyslipidemia  6. Abnormal transaminases         RECOMMENDATIONS:    Agree with the plans to do noninvasive testing with Lexiscan Myoview  Will obtain echocardiogram for LV function assessment  If this is abnormal he may need further workup if is negative  Recommend aggressive risk factor modification  Recommend to add Protonix 40 mg daily  Recommend Counseling for tobacco and alcohol cessation and therapeutic intervention  Recommend to add fish oil capsules to his regimen  When transaminases improve may consider statin therapy    If the Myoview perfusion study is negative for ischemia patient can be discharged and follow-up with primary care for management of above identified medical problems.        Thank you for the consult. I will now sign-off.    Dylan Jones MD  Date of Service: 05/02/2024  10:47 AM

## 2024-05-02 NOTE — PLAN OF CARE
[facility] - Med/Surg  Initial Discharge Assessment       Primary Care Provider: No, Primary Doctor    Admission Diagnosis: Chest pain [R07.9]    Admission Date: 5/1/2024  Expected Discharge Date: 5/3/2024    Transition of Care Barriers: Unisured    Payor: /     Extended Emergency Contact Information  Primary Emergency Contact: Leti MenendezneLilia Devine  Mobile Phone: [phone]  Relation: Significant other  Preferred language: Russian   needed? Yes    Discharge Plan A: Home with family  Discharge Plan B: Home      [id number] - [place], [place] - [address]  [address] & [facility]  [address] [facility] [address]  Phone: [phone] Fax: [phone]    DC assessment completed with patient and spouse at bedside. Verified information on facesheet as correct. Pt lives at listed address with spouse and children. Reports he has help if needed from family. Denies POA, NOK spouse Lilia  Denies PCP.  Pharmacy is [facility]. Denies hh/hd/outpt services/blood thinners. DME-denies. Reports being independent with activities. Drives himself to apts. Reports spouse will provide transportation home upon DC. Denies current medication.  States doesn't have insurance. Denies recent inpt stay in last 30 days.  DC plan is home with family.    Initial Assessment (most recent)       Adult Discharge Assessment - 05/02/24 1036          Discharge Assessment    Assessment Type Discharge Planning Assessment     Confirmed/corrected address, phone number and insurance Yes     Confirmed Demographics Correct on Facesheet     Source of Information patient;family     Communicated MADYSON with patient/caregiver Yes     Reason For Admission chest pain     People in Home child(reji), dependent;spouse     Do you expect to return to your current living situation? Yes     Do you have help at home or someone to help you manage your care at home? No     Prior to hospitilization cognitive status:  Alert/Oriented     Current cognitive status: Alert/Oriented     Walking or Climbing Stairs Difficulty no     Dressing/Bathing Difficulty no     Equipment Currently Used at Home none     Readmission within 30 days? No     Patient currently being followed by outpatient case management? No     Do you currently have service(s) that help you manage your care at home? No     Do you take prescription medications? No     Do you have prescription coverage? No     Do you have any problems affording any of your prescribed medications? TBD     Are you on dialysis? No     Do you take coumadin? No     Discharge Plan A Home with family     Discharge Plan B Home     DME Needed Upon Discharge  none     Discharge Plan discussed with: Patient;Spouse/sig other     Name(s) and Number(s) Lilia [phone]     Transition of Care Barriers Unisured

## 2024-05-06 LAB
OHS QRS DURATION: 94 MS
OHS QTC CALCULATION: 425 MS

## 2024-06-07 ENCOUNTER — HOSPITAL ENCOUNTER (EMERGENCY)
Facility: HOSPITAL | Age: 29
Discharge: HOME | End: 2024-06-07
Attending: EMERGENCY MEDICINE

## 2024-06-07 VITALS
SYSTOLIC BLOOD PRESSURE: 139 MMHG | HEIGHT: 67 IN | BODY MASS INDEX: 37.67 KG/M2 | HEART RATE: 84 BPM | RESPIRATION RATE: 24 BRPM | DIASTOLIC BLOOD PRESSURE: 86 MMHG | WEIGHT: 240 LBS | OXYGEN SATURATION: 94 % | TEMPERATURE: 98 F

## 2024-06-07 DIAGNOSIS — R07.9 CHEST PAIN: ICD-10-CM

## 2024-06-07 LAB
ALBUMIN SERPL BCP-MCNC: 5 G/DL (ref 3.5–5.2)
ALP SERPL-CCNC: 68 U/L (ref 55–135)
ALT SERPL W/O P-5'-P-CCNC: 45 U/L (ref 10–44)
ANION GAP SERPL CALC-SCNC: 10 MMOL/L (ref 8–16)
AST SERPL-CCNC: 22 U/L (ref 10–40)
BASOPHILS # BLD AUTO: 0.06 K/UL (ref 0–0.2)
BASOPHILS NFR BLD: 0.7 % (ref 0–1.9)
BILIRUB SERPL-MCNC: 0.6 MG/DL (ref 0.1–1)
BNP SERPL-MCNC: 9 PG/ML (ref 0–99)
BUN SERPL-MCNC: 8 MG/DL (ref 6–20)
CALCIUM SERPL-MCNC: 9.7 MG/DL (ref 8.7–10.5)
CHLORIDE SERPL-SCNC: 109 MMOL/L (ref 95–110)
CO2 SERPL-SCNC: 21 MMOL/L (ref 23–29)
CREAT SERPL-MCNC: 1 MG/DL (ref 0.5–1.4)
DIFFERENTIAL METHOD BLD: ABNORMAL
EOSINOPHIL # BLD AUTO: 0 K/UL (ref 0–0.5)
EOSINOPHIL NFR BLD: 0.5 % (ref 0–8)
ERYTHROCYTE [DISTWIDTH] IN BLOOD BY AUTOMATED COUNT: 12.8 % (ref 11.5–14.5)
EST. GFR  (NO RACE VARIABLE): >60 ML/MIN/1.73 M^2
GLUCOSE SERPL-MCNC: 99 MG/DL (ref 70–110)
HCT VFR BLD AUTO: 45.9 % (ref 40–54)
HGB BLD-MCNC: 16.2 G/DL (ref 14–18)
IMM GRANULOCYTES # BLD AUTO: 0.02 K/UL (ref 0–0.04)
IMM GRANULOCYTES NFR BLD AUTO: 0.2 % (ref 0–0.5)
LYMPHOCYTES # BLD AUTO: 2.1 K/UL (ref 1–4.8)
LYMPHOCYTES NFR BLD: 24.5 % (ref 18–48)
MAGNESIUM SERPL-MCNC: 2 MG/DL (ref 1.6–2.6)
MCH RBC QN AUTO: 31.1 PG (ref 27–31)
MCHC RBC AUTO-ENTMCNC: 35.3 G/DL (ref 32–36)
MCV RBC AUTO: 88 FL (ref 82–98)
MONOCYTES # BLD AUTO: 0.5 K/UL (ref 0.3–1)
MONOCYTES NFR BLD: 5.7 % (ref 4–15)
NEUTROPHILS # BLD AUTO: 5.9 K/UL (ref 1.8–7.7)
NEUTROPHILS NFR BLD: 68.4 % (ref 38–73)
NRBC BLD-RTO: 0 /100 WBC
PLATELET # BLD AUTO: 279 K/UL (ref 150–450)
PMV BLD AUTO: 11.5 FL (ref 9.2–12.9)
POTASSIUM SERPL-SCNC: 3.8 MMOL/L (ref 3.5–5.1)
PROT SERPL-MCNC: 7.9 G/DL (ref 6–8.4)
RBC # BLD AUTO: 5.21 M/UL (ref 4.6–6.2)
SODIUM SERPL-SCNC: 140 MMOL/L (ref 136–145)
TROPONIN I SERPL HS-MCNC: 12.5 PG/ML (ref 0–14.9)
WBC # BLD AUTO: 8.62 K/UL (ref 3.9–12.7)

## 2024-06-07 PROCEDURE — 83880 ASSAY OF NATRIURETIC PEPTIDE: CPT | Performed by: NURSE PRACTITIONER

## 2024-06-07 PROCEDURE — 93005 ELECTROCARDIOGRAM TRACING: CPT | Performed by: INTERNAL MEDICINE

## 2024-06-07 PROCEDURE — 83735 ASSAY OF MAGNESIUM: CPT | Performed by: NURSE PRACTITIONER

## 2024-06-07 PROCEDURE — 93010 ELECTROCARDIOGRAM REPORT: CPT | Mod: ,,, | Performed by: INTERNAL MEDICINE

## 2024-06-07 PROCEDURE — 99285 EMERGENCY DEPT VISIT HI MDM: CPT | Mod: 25

## 2024-06-07 PROCEDURE — 85025 COMPLETE CBC W/AUTO DIFF WBC: CPT | Performed by: NURSE PRACTITIONER

## 2024-06-07 PROCEDURE — 84484 ASSAY OF TROPONIN QUANT: CPT | Performed by: NURSE PRACTITIONER

## 2024-06-07 PROCEDURE — 25000003 PHARM REV CODE 250: Performed by: NURSE PRACTITIONER

## 2024-06-07 PROCEDURE — 80053 COMPREHEN METABOLIC PANEL: CPT | Performed by: NURSE PRACTITIONER

## 2024-06-07 RX ORDER — NAPROXEN 250 MG/1
500 TABLET ORAL
Status: COMPLETED | OUTPATIENT
Start: 2024-06-07 | End: 2024-06-07

## 2024-06-07 RX ORDER — METHOCARBAMOL 500 MG/1
500 TABLET, FILM COATED ORAL
Status: COMPLETED | OUTPATIENT
Start: 2024-06-07 | End: 2024-06-07

## 2024-06-07 RX ADMIN — METHOCARBAMOL 500 MG: 500 TABLET ORAL at 11:06

## 2024-06-07 RX ADMIN — NAPROXEN 500 MG: 250 TABLET ORAL at 11:06

## 2024-06-07 NOTE — FIRST PROVIDER EVALUATION
Emergency Department TeleTriage Encounter Note      CHIEF COMPLAINT    Chief Complaint   Patient presents with    Chest Pain     Chest pain for 2 months       VITAL SIGNS   Initial Vitals [06/07/24 1017]   BP Pulse Resp Temp SpO2   (!) 164/103 106 20 97.7 °F (36.5 °C) 98 %      MAP       --            ALLERGIES    Review of patient's allergies indicates:  No Known Allergies    PROVIDER TRIAGE NOTE  This is a teletriage evaluation of a 29 y.o. male presenting to the ED complaining of CP.  Pt's primary language is Mongolian. Requesting .    Alert, sitting upright.     Initial orders will be placed and care will be transferred to an alternate provider when patient is roomed for a full evaluation. Any additional orders and the final disposition will be determined by that provider.         ORDERS  Labs Reviewed - No data to display    ED Orders (720h ago, onward)      Start Ordered     Status Ordering Provider    06/07/24 1028 06/07/24 1027  EKG 12-lead  Once         Ordered GLORIA PEREZ    06/07/24 1021 06/07/24 1020  EKG 12-lead  Once         Completed by MAIRA GRANADOS on 6/7/2024 at 10:22 AM ARCELIA AGUILAR              Virtual Visit Note: The provider triage portion of this emergency department evaluation and documentation was performed via [employer], a HIPAA-compliant telemedicine application, in concert with a tele-presenter in the room. A face to face patient evaluation with one of my colleagues will occur once the patient is placed in an emergency department room.      DISCLAIMER: This note was prepared with [employer] voice recognition transcription software. Garbled syntax, mangled pronouns, and other bizarre constructions may be attributed to that software system.

## 2024-06-07 NOTE — ED PROVIDER NOTES
Encounter Date: 6/7/2024       History     Chief Complaint   Patient presents with    Chest Pain     Chest pain for 2 months     Patient with 5 month history of intermittent chest pain.  Pain is associated with shortness breath and anxiety.  Occasionally radiating to left arm.  Recent admission with negative stress test.  Patient reports continued symptoms.      Review of patient's allergies indicates:  No Known Allergies  Past Medical History:   Diagnosis Date    Hypertension      History reviewed. No pertinent surgical history.  No family history on file.  Social History     Tobacco Use    Smoking status: Every Day     Current packs/day: 0.50     Types: Cigarettes    Smokeless tobacco: Never   Substance Use Topics    Alcohol use: Yes     Alcohol/week: 84.0 standard drinks of alcohol     Types: 84 Cans of beer per week     Comment: soc    Drug use: Yes     Types: Cocaine     Comment: last time used last weekend     Review of Systems   Constitutional:  Negative for chills and fever.   HENT:  Negative for sore throat.    Eyes:  Negative for photophobia and visual disturbance.   Respiratory:  Positive for shortness of breath.    Cardiovascular:  Positive for chest pain.   Gastrointestinal:  Negative for abdominal pain and vomiting.   Genitourinary:  Negative for dysuria.   Musculoskeletal:  Negative for joint swelling.   Skin:  Negative for rash.   Neurological:  Negative for weakness and headaches.   Psychiatric/Behavioral:  Negative for confusion.        Physical Exam     Initial Vitals [06/07/24 1017]   BP Pulse Resp Temp SpO2   (!) 164/103 106 20 97.7 °F (36.5 °C) 98 %      MAP       --         Physical Exam    Nursing note and vitals reviewed.  Constitutional: He is not diaphoretic. No distress.   HENT:   Head: Normocephalic and atraumatic.   Eyes: Conjunctivae are normal.   Neck:   Normal range of motion.  Cardiovascular:  Regular rhythm.           Pulmonary/Chest: Breath sounds normal.   Abdominal: Abdomen is  soft. There is no abdominal tenderness.   Musculoskeletal:         General: Normal range of motion.      Cervical back: Normal range of motion.     Skin: No rash noted.   Psychiatric: He has a normal mood and affect.         ED Course   Procedures  Labs Reviewed   CBC W/ AUTO DIFFERENTIAL - Abnormal; Notable for the following components:       Result Value    MCH 31.1 (*)     All other components within normal limits   COMPREHENSIVE METABOLIC PANEL - Abnormal; Notable for the following components:    CO2 21 (*)     ALT 45 (*)     All other components within normal limits   B-TYPE NATRIURETIC PEPTIDE   MAGNESIUM   TROPONIN I HIGH SENSITIVITY   PROTIME-INR        ECG Results              EKG 12-lead (In process)        Collection Time Result Time QRS Duration OHS QTC Calculation    06/07/24 10:19:02 06/07/24 10:29:21 96 415                     In process by Interface, Lab In [facility] (06/07/24 10:29:24)                   Narrative:    Test Reason : R07.9,    Vent. Rate : 100 BPM     Atrial Rate : 100 BPM     P-R Int : 132 ms          QRS Dur : 096 ms      QT Int : 322 ms       P-R-T Axes : 054 027 030 degrees     QTc Int : 415 ms    Normal sinus rhythm  Normal ECG  When compared with ECG of 01-MAY-2024 12:39,  Borderline criteria for Inferior infarct are no longer Present  T wave inversion no longer evident in Inferior leads  Nonspecific T wave abnormality no longer evident in Lateral leads    Referred By:             Confirmed By:                                   Imaging Results              X-Ray Chest AP Portable (Final result)  Result time 06/07/24 11:06:24      Final result by Cheryl Lawson MD (06/07/24 11:06:24)                   Impression:      No acute cardiopulmonary abnormality.      Electronically signed by: Cheryl Lawson  Date:    06/07/2024  Time:    11:06               Narrative:    EXAMINATION:  XR CHEST AP PORTABLE    CLINICAL HISTORY:  chest pain;    FINDINGS:  Portable chest at 11:01 is  compared to 05/01/2024 shows normal cardiomediastinal silhouette.    Lungs are clear. Pulmonary vasculature is normal. No acute osseous abnormality.                                       Medications   methocarbamoL tablet 500 mg (500 mg Oral Given 6/7/24 1110)   naproxen tablet 500 mg (500 mg Oral Given 6/7/24 1110)     Medical Decision Making  With chest pain.  Differential diagnosis includes acute coronary syndrome, chest wall pain, radiculopathy, anxiety.  Here CBC CMP troponin unremarkable.  Chest x-ray no acute cardiopulmonary process, EKG normal sinus rhythm no acute ST segment changes.  Here patient presents with chest pain.  This is been going on for months.  Previous stress tests reviewed and negative.  Clinically patient is having a lot of anxiety is likely etiology.  He has not having a myocardial infarction.  Stable for discharge.    Amount and/or Complexity of Data Reviewed  Labs:  Decision-making details documented in ED Course.  Radiology:  Decision-making details documented in ED Course.  ECG/medicine tests:  Decision-making details documented in ED Course.                                      Clinical Impression:  Final diagnoses:  [R07.9] Chest pain          ED Disposition Condition    Discharge Stable          ED Prescriptions    None       Follow-up Information       Follow up With Specialties Details Why Contact Info Additional Information    [facility] - Emergency Dept Emergency Medicine  If symptoms worsen [address]  [facility] [id number] [phone] 1st floor    [facility]  Schedule an appointment as soon as possible for a visit   [address] [facility] [address]  [phone]                Magan Palomino MD  06/07/24 1221

## 2024-06-08 LAB
OHS QRS DURATION: 96 MS
OHS QTC CALCULATION: 415 MS